# Patient Record
Sex: FEMALE | Race: WHITE | Employment: FULL TIME | URBAN - METROPOLITAN AREA
[De-identification: names, ages, dates, MRNs, and addresses within clinical notes are randomized per-mention and may not be internally consistent; named-entity substitution may affect disease eponyms.]

---

## 2018-04-30 ENCOUNTER — OFFICE VISIT (OUTPATIENT)
Dept: PULMONOLOGY | Age: 53
End: 2018-04-30
Payer: COMMERCIAL

## 2018-04-30 ENCOUNTER — HOSPITAL ENCOUNTER (OUTPATIENT)
Age: 53
Discharge: HOME | End: 2018-04-30
Payer: COMMERCIAL

## 2018-04-30 VITALS
SYSTOLIC BLOOD PRESSURE: 122 MMHG | BODY MASS INDEX: 34.23 KG/M2 | DIASTOLIC BLOOD PRESSURE: 86 MMHG | TEMPERATURE: 97.1 F | HEART RATE: 78 BPM | OXYGEN SATURATION: 98 % | RESPIRATION RATE: 16 BRPM | WEIGHT: 186 LBS | HEIGHT: 62 IN

## 2018-04-30 DIAGNOSIS — J45.50 POORLY CONTROLLED SEVERE PERSISTENT ASTHMA WITHOUT COMPLICATION: Primary | ICD-10-CM

## 2018-04-30 DIAGNOSIS — Z91.09 MULTIPLE ENVIRONMENTAL ALLERGIES: ICD-10-CM

## 2018-04-30 DIAGNOSIS — J82.83 EOSINOPHILIC ASTHMA: ICD-10-CM

## 2018-04-30 DIAGNOSIS — Z87.891 HISTORY OF SMOKING LESS THAN 10 PACK YEARS: ICD-10-CM

## 2018-04-30 DIAGNOSIS — J45.50 POORLY CONTROLLED SEVERE PERSISTENT ASTHMA WITHOUT COMPLICATION: ICD-10-CM

## 2018-04-30 LAB
EOSINOPHILS ABSOLUTE: 78 /UL (ref 200–400)
EOSINOPHILS RELATIVE PERCENT: ABNORMAL %
IGE: 67 IU/ML
WBC # BLD: ABNORMAL K/UL

## 2018-04-30 PROCEDURE — G8417 CALC BMI ABV UP PARAM F/U: HCPCS | Performed by: INTERNAL MEDICINE

## 2018-04-30 PROCEDURE — 82785 ASSAY OF IGE: CPT

## 2018-04-30 PROCEDURE — 3017F COLORECTAL CA SCREEN DOC REV: CPT | Performed by: INTERNAL MEDICINE

## 2018-04-30 PROCEDURE — 1036F TOBACCO NON-USER: CPT | Performed by: INTERNAL MEDICINE

## 2018-04-30 PROCEDURE — 99205 OFFICE O/P NEW HI 60 MIN: CPT | Performed by: INTERNAL MEDICINE

## 2018-04-30 PROCEDURE — 85048 AUTOMATED LEUKOCYTE COUNT: CPT

## 2018-04-30 PROCEDURE — 36415 COLL VENOUS BLD VENIPUNCTURE: CPT

## 2018-04-30 PROCEDURE — G8427 DOCREV CUR MEDS BY ELIG CLIN: HCPCS | Performed by: INTERNAL MEDICINE

## 2018-04-30 RX ORDER — ALBUTEROL SULFATE 90 UG/1
2 AEROSOL, METERED RESPIRATORY (INHALATION) EVERY 6 HOURS PRN
COMMUNITY
End: 2018-04-30 | Stop reason: SDUPTHER

## 2018-04-30 RX ORDER — GABAPENTIN 100 MG/1
100 CAPSULE ORAL DAILY PRN
COMMUNITY

## 2018-04-30 RX ORDER — SERTRALINE HYDROCHLORIDE 100 MG/1
100 TABLET, FILM COATED ORAL DAILY
COMMUNITY

## 2018-04-30 RX ORDER — MONTELUKAST SODIUM 10 MG/1
10 TABLET ORAL NIGHTLY
Qty: 30 TABLET | Refills: 11 | Status: SHIPPED | OUTPATIENT
Start: 2018-04-30 | End: 2019-05-22 | Stop reason: SDUPTHER

## 2018-04-30 RX ORDER — FLUTICASONE FUROATE AND VILANTEROL 200; 25 UG/1; UG/1
1 POWDER RESPIRATORY (INHALATION) DAILY
Qty: 2 EACH | Refills: 0 | COMMUNITY
Start: 2018-04-30 | End: 2019-07-29

## 2018-04-30 RX ORDER — FLUTICASONE FUROATE AND VILANTEROL 200; 25 UG/1; UG/1
1 POWDER RESPIRATORY (INHALATION) DAILY
Qty: 1 EACH | Refills: 11 | Status: SHIPPED | OUTPATIENT
Start: 2018-04-30 | End: 2019-05-04 | Stop reason: SDUPTHER

## 2018-04-30 RX ORDER — ALBUTEROL SULFATE 90 UG/1
2 AEROSOL, METERED RESPIRATORY (INHALATION) EVERY 6 HOURS PRN
Qty: 1 INHALER | Refills: 11 | Status: SHIPPED | OUTPATIENT
Start: 2018-04-30 | End: 2019-07-29 | Stop reason: SDUPTHER

## 2018-04-30 ASSESSMENT — ENCOUNTER SYMPTOMS
GASTROINTESTINAL NEGATIVE: 1
EYES NEGATIVE: 1
SHORTNESS OF BREATH: 1
COUGH: 1
WHEEZING: 1
ALLERGIC/IMMUNOLOGIC NEGATIVE: 1

## 2018-07-30 ENCOUNTER — OFFICE VISIT (OUTPATIENT)
Dept: PULMONOLOGY | Age: 53
End: 2018-07-30
Payer: COMMERCIAL

## 2018-07-30 VITALS
SYSTOLIC BLOOD PRESSURE: 117 MMHG | BODY MASS INDEX: 34.23 KG/M2 | HEIGHT: 62 IN | TEMPERATURE: 98.9 F | RESPIRATION RATE: 16 BRPM | OXYGEN SATURATION: 97 % | DIASTOLIC BLOOD PRESSURE: 87 MMHG | WEIGHT: 186 LBS | HEART RATE: 75 BPM

## 2018-07-30 DIAGNOSIS — J45.20 INTERMITTENT ASTHMA WITHOUT COMPLICATION, UNSPECIFIED ASTHMA SEVERITY: ICD-10-CM

## 2018-07-30 DIAGNOSIS — J82.83 EOSINOPHILIC ASTHMA: ICD-10-CM

## 2018-07-30 DIAGNOSIS — Z91.09 MULTIPLE ENVIRONMENTAL ALLERGIES: Primary | ICD-10-CM

## 2018-07-30 DIAGNOSIS — Z87.891 HISTORY OF SMOKING LESS THAN 10 PACK YEARS: ICD-10-CM

## 2018-07-30 PROCEDURE — 3017F COLORECTAL CA SCREEN DOC REV: CPT | Performed by: NURSE PRACTITIONER

## 2018-07-30 PROCEDURE — G8417 CALC BMI ABV UP PARAM F/U: HCPCS | Performed by: NURSE PRACTITIONER

## 2018-07-30 PROCEDURE — 99214 OFFICE O/P EST MOD 30 MIN: CPT | Performed by: NURSE PRACTITIONER

## 2018-07-30 PROCEDURE — G8427 DOCREV CUR MEDS BY ELIG CLIN: HCPCS | Performed by: NURSE PRACTITIONER

## 2018-07-30 PROCEDURE — 1036F TOBACCO NON-USER: CPT | Performed by: NURSE PRACTITIONER

## 2018-07-30 ASSESSMENT — ENCOUNTER SYMPTOMS
ALLERGIC/IMMUNOLOGIC NEGATIVE: 1
EYES NEGATIVE: 1
GASTROINTESTINAL NEGATIVE: 1

## 2018-07-30 NOTE — PROGRESS NOTES
performed during the hospital encounter of 04/30/18   Eosinophil Count   Result Value Ref Range    WBC NOT REPORTED k/uL    Eosinophils % NOT REPORTED %    Eosinophils # 78 (L) 200 - 400 /uL   IgE   Result Value Ref Range    IgE 67 <101 IU/mL       No results found. Assessment:      1. Multiple environmental allergies    2. History of smoking less than 10 pack years    3. Eosinophilic asthma ([address]    4. Intermittent asthma without complication, unspecified asthma severity          Plan:      1. Medications reviewed, continue as ordered. 2. Refills were provided - no  3. Educated and clarified the medication use. 4. Recommend flu vaccination in the fall annually. Due in fall of 2018, patient reluctant. Education provided on benefits of flu vaccination with underlying asthma  5. Maintain an active lifestyle. 6. Patient's questions were answered to her satisfaction. 7. Former smoker, ongoing cessation advised. 8. We'll see the patient back in 12 months, sooner if experiencing increased shortness of breath cough/wheeze.         Electronically signed by CHAYO Gaytan CNP on 7/30/2018 at 1:36 PM

## 2019-03-06 ENCOUNTER — INITIAL CONSULT (OUTPATIENT)
Dept: ONCOLOGY | Age: 54
End: 2019-03-06
Payer: COMMERCIAL

## 2019-03-06 ENCOUNTER — HOSPITAL ENCOUNTER (OUTPATIENT)
Facility: MEDICAL CENTER | Age: 54
End: 2019-03-06
Payer: COMMERCIAL

## 2019-03-06 DIAGNOSIS — Z17.0 MALIGNANT NEOPLASM OF LEFT BREAST IN FEMALE, ESTROGEN RECEPTOR POSITIVE, UNSPECIFIED SITE OF BREAST (HCC): Primary | ICD-10-CM

## 2019-03-06 DIAGNOSIS — C50.912 MALIGNANT NEOPLASM OF LEFT BREAST IN FEMALE, ESTROGEN RECEPTOR POSITIVE, UNSPECIFIED SITE OF BREAST (HCC): Primary | ICD-10-CM

## 2019-03-06 PROCEDURE — 96040 PR GENETIC COUNSELING, EACH 30 MIN: CPT | Performed by: GENETIC COUNSELOR, MS

## 2019-03-20 ENCOUNTER — TELEPHONE (OUTPATIENT)
Dept: ONCOLOGY | Age: 54
End: 2019-03-20

## 2019-03-20 NOTE — TELEPHONE ENCOUNTER
do not affect her current cancer treatment. Ms. Antoinette Liriano should continue cancer treatment and surveillance as directed by her physicians. OVARIAN CANCER  Without a known hereditary mutation and no known family history of ovarian cancer, Ms. De Los Santoss risk for ovarian cancer is expected to be equal to the general population risk of 1-2%. Therefore, ovarian cancer screening or risk reducing surgery is not recommended. She should continue gynecologic cancer screening as directed by her physicians. GENERAL CANCER   Ms. Antoinette Liriano should continue general population cancer screening guidelines as directed by her physicians. RECOMMENDATIONS FOR FAMILY MEMBERS   1) Genetic testing is not recommended for Ms. Snyder's children based on her negative test results. However, this recommendation does not take into consideration any family history of cancer in their paternal family. Additionally, there is a low likelihood that the other cancers in Ms. De Los Santoss family are caused by a hereditary gene mutation. Therefore, based on the family history information provided by Ms. Snyder, genetic testing is not recommended for other family members at this time. 2) We encourage Ms. De Los Santoss relatives to discuss their family history of cancer with their physicians to determine the most appropriate cancer screening recommendations. Ms. Snyder's female relatives may benefit from a formal breast cancer risk assessment by the [facility] or [facility] risk models to determine if additional breast cancer screening is warranted. SUMMARY & PLAN   1) There are no changes in medical management for Ms. Snyder based on her negative genetic test results. She should continue cancer surveillance as directed by her physicians. 2) We will contact Ms. 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 3) We encourage Ms. Snyder to contact us every 1-2 years to determine if there are any new genetic testing or research options available. 4) We encourage Ms. Snyder to contact us with updates to her personal and/or familys cancer history as this information may alter our assessment and/or recommendations. The [address] [place] [address] would be glad to offer our assistance should you have any questions or concerns about this information. Please feel free to contact us at [phone]. Fay Spears.  Jessica Post MS, [facility]   Licensed Genetic Counselor         CC:  MD Frederick Howe MD

## 2019-04-26 ENCOUNTER — TELEPHONE (OUTPATIENT)
Dept: RADIATION ONCOLOGY | Age: 54
End: 2019-04-26

## 2019-05-04 DIAGNOSIS — J45.50 POORLY CONTROLLED SEVERE PERSISTENT ASTHMA WITHOUT COMPLICATION: ICD-10-CM

## 2019-05-06 NOTE — TELEPHONE ENCOUNTER
Dr Robynn Severin, patient is current and due in for an appointment on 7/29/19. Per Yolie Covington last dictation patient is on Breo. Please sign for refill if ok. Thank you.

## 2019-05-14 ENCOUNTER — TELEPHONE (OUTPATIENT)
Dept: RADIATION ONCOLOGY | Age: 54
End: 2019-05-14

## 2019-05-14 NOTE — TELEPHONE ENCOUNTER
19 I called pt to remind of consult appt tomorrow. I called Dr. Samayoa House [facility] to check if oncotype is back, not back yet. Called [facility] to check if oncotype back, not back yet. I spoke to Keisha Henson, she said to cancel pts consult tomorrow, wait on oncotype. REsched consult 19, pt advised.

## 2019-05-22 ENCOUNTER — TELEPHONE (OUTPATIENT)
Dept: RADIATION ONCOLOGY | Age: 54
End: 2019-05-22

## 2019-05-22 DIAGNOSIS — J45.50 POORLY CONTROLLED SEVERE PERSISTENT ASTHMA WITHOUT COMPLICATION: ICD-10-CM

## 2019-05-22 RX ORDER — MONTELUKAST SODIUM 10 MG/1
TABLET ORAL
Qty: 30 TABLET | Refills: 2 | Status: SHIPPED | OUTPATIENT
Start: 2019-05-22 | End: 2019-07-29 | Stop reason: SDUPTHER

## 2019-05-22 NOTE — TELEPHONE ENCOUNTER
Pt called to cancel her RO consult 5/24/19, she states she had appt w/Dr. Chacha Richard yesterday and she has to do chemo first, so wants to delay RO consult until towards end of chemo.   RN notified, pt placed on pending list.

## 2019-07-12 ENCOUNTER — TELEPHONE (OUTPATIENT)
Dept: RADIATION ONCOLOGY | Age: 54
End: 2019-07-12

## 2019-07-29 ENCOUNTER — OFFICE VISIT (OUTPATIENT)
Dept: PULMONOLOGY | Age: 54
End: 2019-07-29
Payer: COMMERCIAL

## 2019-07-29 VITALS
HEART RATE: 103 BPM | HEIGHT: 61 IN | SYSTOLIC BLOOD PRESSURE: 128 MMHG | OXYGEN SATURATION: 97 % | BODY MASS INDEX: 35.95 KG/M2 | RESPIRATION RATE: 14 BRPM | DIASTOLIC BLOOD PRESSURE: 86 MMHG | WEIGHT: 190.4 LBS

## 2019-07-29 DIAGNOSIS — J45.50 POORLY CONTROLLED SEVERE PERSISTENT ASTHMA WITHOUT COMPLICATION: ICD-10-CM

## 2019-07-29 DIAGNOSIS — Z91.09 MULTIPLE ENVIRONMENTAL ALLERGIES: Primary | ICD-10-CM

## 2019-07-29 DIAGNOSIS — J82.83 EOSINOPHILIC ASTHMA: ICD-10-CM

## 2019-07-29 PROCEDURE — 99213 OFFICE O/P EST LOW 20 MIN: CPT | Performed by: NURSE PRACTITIONER

## 2019-07-29 RX ORDER — MONTELUKAST SODIUM 10 MG/1
TABLET ORAL
Qty: 30 TABLET | Refills: 11 | Status: SHIPPED | OUTPATIENT
Start: 2019-07-29 | End: 2020-08-04

## 2019-07-29 RX ORDER — ALBUTEROL SULFATE 90 UG/1
2 AEROSOL, METERED RESPIRATORY (INHALATION) EVERY 6 HOURS PRN
Qty: 1 INHALER | Refills: 11 | Status: SHIPPED | OUTPATIENT
Start: 2019-07-29 | End: 2021-08-31 | Stop reason: SDUPTHER

## 2019-07-29 RX ORDER — FLUTICASONE FUROATE AND VILANTEROL 200; 25 UG/1; UG/1
POWDER RESPIRATORY (INHALATION)
Qty: 1 EACH | Refills: 11 | Status: SHIPPED | OUTPATIENT
Start: 2019-07-29 | End: 2020-08-03

## 2019-07-29 RX ORDER — LETROZOLE 2.5 MG/1
2.5 TABLET, FILM COATED ORAL DAILY
COMMUNITY

## 2019-07-29 ASSESSMENT — ENCOUNTER SYMPTOMS
EYES NEGATIVE: 1
GASTROINTESTINAL NEGATIVE: 1
ALLERGIC/IMMUNOLOGIC NEGATIVE: 1

## 2019-07-29 NOTE — PROGRESS NOTES
Alcohol use: Yes     Alcohol/week: 2.0 standard drinks     Types: 2 Glasses of wine per week     Comment: Per day    Drug use: No    Sexual activity: Not on file   Lifestyle    Physical activity:     Days per week: Not on file     Minutes per session: Not on file    Stress: Not on file   Relationships    Social connections:     Talks on phone: Not on file     Gets together: Not on file     Attends Synagogue service: Not on file     Active member of club or organization: Not on file     Attends meetings of clubs or organizations: Not on file     Relationship status: Not on file    Intimate partner violence:     Fear of current or ex partner: Not on file     Emotionally abused: Not on file     Physically abused: Not on file     Forced sexual activity: Not on file   Other Topics Concern    Not on file   Social History Narrative    Not on file       Review of Systems   Constitutional: Negative. HENT: Negative. Eyes: Negative. Respiratory:        Exertional shortness of breath, denies cough, mucus production, hemoptysis. Cardiovascular: Negative. Gastrointestinal: Negative. Endocrine: Negative. Genitourinary: Negative. Musculoskeletal: Negative. Skin: Negative. Allergic/Immunologic: Negative. Neurological: Negative. Hematological: Negative. Psychiatric/Behavioral: Negative.         Objective:      Physical Exam   General appearance - alert, well appearing, and in no distress, oriented to person, place, and time and normal appearing weight  Mental status - alert, oriented to person, place, and time, normal mood, behavior, speech, dress, motor activity, and thought processes  Eyes - pupils equal and reactive, extraocular eye movements intact  Ears - not examined  Nose - normal and patent, no erythema, discharge or polyps  Mouth - mucous membranes moist, pharynx normal without lesions  Neck - supple, no significant adenopathy  Chest - clear to auscultation, no wheezes, rales or rhonchi, symmetric air entry  Heart -normal rate, regular rhythm, normal S1, S2, no murmurs, rubs, clicks or gallops  Abdomen - soft, nontender, nondistended, no masses or organomegaly  Neurological - alert, oriented, normal speech, no focal findings or movement disorder noted}  Extremities - peripheral pulses normal, no pedal edema, no clubbing or cyanosis  Skin - normal coloration and turgor, no rashes, no suspicious skin lesions noted     Wt Readings from Last 3 Encounters:   07/29/19 190 lb 6.4 oz (86.4 kg)   07/30/18 186 lb (84.4 kg)   04/30/18 186 lb (84.4 kg)       Results for orders placed or performed during the hospital encounter of 04/30/18   Eosinophil Count   Result Value Ref Range    WBC NOT REPORTED k/uL    Eosinophils % NOT REPORTED %    Eosinophils # 78 (L) 200 - 400 /uL   IgE   Result Value Ref Range    IgE 67 <101 IU/mL       No results found. Assessment:      1. Multiple environmental allergies    2. Poorly controlled severe persistent asthma without complication    3. Eosinophilic asthma ([facility] [address]          Plan:      1. Medications reviewed, continue as ordered. 2. Refills were provided - yes  3. Educated and clarified the medication use. 4. Recommend flu vaccination in the fall annually. Refuses  5. Recommendations given regarding pneumococcal vaccinations. Refuses  6. Maintain an active lifestyle. 7. Patient's questions were answered to her satisfaction. 6. Former smoker. Ongoing smoking cessation advised.   9. We'll see the patient back in 12 months        Electronically signed by CHAYO Aguilar CNP on 7/29/2019 at 2:19 PM

## 2019-08-13 ENCOUNTER — HOSPITAL ENCOUNTER (OUTPATIENT)
Dept: RADIATION ONCOLOGY | Age: 54
Discharge: HOME | End: 2019-08-13
Payer: COMMERCIAL

## 2019-08-13 VITALS
TEMPERATURE: 99.3 F | SYSTOLIC BLOOD PRESSURE: 116 MMHG | HEART RATE: 90 BPM | OXYGEN SATURATION: 98 % | WEIGHT: 193 LBS | RESPIRATION RATE: 18 BRPM | BODY MASS INDEX: 35.51 KG/M2 | DIASTOLIC BLOOD PRESSURE: 81 MMHG | HEIGHT: 62 IN

## 2019-08-13 DIAGNOSIS — Z17.0 MALIGNANT NEOPLASM OF LOWER-INNER QUADRANT OF LEFT BREAST IN FEMALE, ESTROGEN RECEPTOR POSITIVE (HCC): ICD-10-CM

## 2019-08-13 DIAGNOSIS — C50.312 MALIGNANT NEOPLASM OF LOWER-INNER QUADRANT OF LEFT BREAST IN FEMALE, ESTROGEN RECEPTOR POSITIVE (HCC): ICD-10-CM

## 2019-08-13 PROCEDURE — 99213 OFFICE O/P EST LOW 20 MIN: CPT | Performed by: RADIOLOGY

## 2019-08-13 NOTE — PROGRESS NOTES
Referring Physician: Dr Lelia Flores:    19 1045   BP: 116/81   Pulse: 90   Resp: 18   Temp: 99.3 °F (37.4 °C)   SpO2: 98%    :  Patient Currently in Pain: Denies             Wt Readings from Last 1 Encounters:   19 193 lb (87.5 kg)        Body mass index is 35.88 kg/m². Height: 5' 1.5\" (156.2 cm)         Immunizations:    Influenza status:    []   Current   [x]   Patient declined    Pneumococcal status:  []   Current  [x]   Patient declined    Smoking Status:    [] Smoker - PPD:   [x] Nonsmoker - Quit Date:  0             [] Never a smoker             LMP:     Age at first Menses: 16    Para: 2    : 2    Cup size: 45 D        No chief complaint on file. Cancer Staging  No matching staging information was found for the patient. Prior Radiation Therapy? No   If yes, site treated:   Facility:                             Date:    Concurrent Chemo/radiation? No   If yes, start date:    Prior Chemotherapy? Yes   If yes    Facility: [facility]                              Date:     Prior Hormonal Therapy? No   If yes   Facility:                             Date:    Head and Neck Cancer? No   If yes, please remind physician to place swallow study order and speech therapy order. Pacemaker/Defibulator/ICD:  No              Current Outpatient Medications   Medication Sig Dispense Refill    letrozole (FEMARA) 2.5 MG tablet Take 2.5 mg by mouth daily      Fluticasone Furoate-Vilanterol (BREO ELLIPTA) 200-25 MCG/INH AEPB INHALE ONE DOSE BY MOUTH INTO THE LUNGS DAILY 1 each 11    montelukast (SINGULAIR) 10 MG tablet TAKE ONE TABLET BY MOUTH ONCE NIGHTLY 30 tablet 11    albuterol sulfate HFA (VENTOLIN HFA) 108 (90 Base) MCG/ACT inhaler Inhale 2 puffs into the lungs every 6 hours as needed for Wheezing 1 Inhaler 11    sertraline (ZOLOFT) 100 MG tablet Take 100 mg by mouth daily      gabapentin (NEURONTIN) 100 MG capsule Take 100 mg by mouth daily as needed.        Cetirizine HCl (ZYRTEC ALLERGY PO) Take by mouth       No current facility-administered medications for this encounter. Past Medical History:   Diagnosis Date    Anxiety     Asthma     Eosinophilic asthma ([address]     History of smoking less than 10 pack years     Multiple environmental allergies        Past Surgical History:   Procedure Laterality Date    MOUTH SURGERY      Lambert Teeth       Family History   Problem Relation Age of Onset    Colon Cancer Paternal Grandmother         dx older   [facility] Prostate Cancer Father         64-70    Cancer Maternal Grandfather         bladder        Social History     Socioeconomic History    Marital status:      Spouse name: Not on file    Number of children: Not on file    Years of education: Not on file    Highest education level: Not on file   Occupational History    Not on file   Social Needs    Financial resource strain: Not on file    Food insecurity:     Worry: Not on file     Inability: Not on file    Transportation needs:     Medical: Not on file     Non-medical: Not on file   Tobacco Use    Smoking status: Former Smoker     Types: Cigarettes     Last attempt to quit: 1997     Years since quittin.0    Smokeless tobacco: Never Used   Substance and Sexual Activity    Alcohol use:  Yes     Alcohol/week: 2.0 standard drinks     Types: 2 Glasses of wine per week     Comment: Per day    Drug use: No    Sexual activity: Not on file   Lifestyle    Physical activity:     Days per week: Not on file     Minutes per session: Not on file    Stress: Not on file   Relationships    Social connections:     Talks on phone: Not on file     Gets together: Not on file     Attends Worship service: Not on file     Active member of club or organization: Not on file     Attends meetings of clubs or organizations: Not on file     Relationship status: Not on file    Intimate partner violence:     Fear of current or ex partner: Not on file Emotionally abused: Not on file     Physically abused: Not on file     Forced sexual activity: Not on file   Other Topics Concern    Not on file   Social History Narrative    Not on file             FALLS RISK SCREEN  Instructions:  Assess the patient and enter the appropriate indicators that are present for fall risk identification. Total the numbers entered and assign a fall risk score from Table 2.  Reassess patient at a minimum every 12 weeks or with status change. Assessment   Date  8/13/2019     1. Mental Ability: confusion/cognitively impaired 0     2. Elimination Issues: incontinence, frequency 0       3. Ambulatory: use of assistive devices (walker, cane, off-loading devices),        attached to equipment (IV pole, oxygen) 0     4. Sensory Limitations: dizziness, vertigo, impaired vision 0     5. Age less than 65        0     6. Age 72 or greater 0     7. Medication: diuretics, strong analgesics, hypnotics, sedatives,        antihypertensive agents 0   8. Falls:  recent history of falls within the last 3 months (not to include slipping or        tripping) 0   TOTAL 0    If score of 4 or greater was education given? No       TABLE 2   Risk Score Risk Level Plan of Care   0-3 Little or  No Risk 1. Provide assistance as indicated for ambulation activities  2. Reorient confused/cognitively impaired patient  3. Call-light/bell within patient's reach  4. Chair/bed in low position, stretcher/bed with siderails up except when performing patient care activities  5. Educate patient/family/caregiver on falls prevention  6.  Reassess in 12 weeks or with any noted change in patient condition which places them at a risk for a fall   4-6 Moderate Risk 1. Provide assistance as indicated for ambulation activities  2. Reorient confused/cognitively impaired patient  3. Call-light/bell within patient's reach  4.   Chair/bed in low position, stretcher/bed with siderails up except when performing patient

## 2019-08-14 NOTE — CONSULTS
either breast.  The axillae both right and left are without adenopathy. ASSESSMENT AND PLAN:   Ernesto Resendiz is a 49 y.o. female with a Cancer Staging  Malignant neoplasm of lower-inner quadrant of left breast in female, estrogen receptor positive ([facility] [address]  Staging form: Breast, AJCC 8th Edition  - Clinical: No stage assigned - Unsigned  - Pathologic stage from 8/13/2019: Stage IIA (pT2, pN0(sn), cM0, G2, ER+, WI-, HER2-, Oncotype DX score: 30) - Signed by Mayra Hernandez MD on 8/13/2019    The patient is a 43-year-old female with a diagnosis of an invasive lumbar carcinoma offered the left breast status post lumpectomy on 4/5/2019 with a pathological stage as above. See details above regarding the lumpectomy and reexcision. The patient did have an Oncotype DX tests which revealed a high risk score and therefore proceeded with adjuvant chemotherapy which was completed on 8/9/2019. The patient received TC. The patient is now a candidate for adjuvant radiation therapy to the left the breasts. I did review with the patient multiple clinical trials that documented the clinical benefit for adjuvant radiation therapy to the breast in this scenario. I reviewed the logistics, expected outcome possible side effects of radiation therapy. An informed consent was obtained. The patient will therefore proceeded to a CT simulation. She will then be treated to the left breast utilizing the hypo-fractionated regimen and the deep inspiratory breath hold technique. This will allow us to decrease the dose to the underlying heart as much as possible. Prior to commencing treatment the patient will have repeat CBC.          Electronically signed by Mayra Hernandez MD on 8/13/2019 at [address] [place] [address]:  Patient Care Team:  Rose Simpson DO as PCP - General (Family Medicine)  Faiza Morris DO as Consulting Physician (Pulmonology)     Drugs Prescribed:  New 08:22

## 2019-08-22 ENCOUNTER — HOSPITAL ENCOUNTER (OUTPATIENT)
Dept: RADIATION ONCOLOGY | Age: 54
Discharge: HOME | End: 2019-08-22
Attending: RADIOLOGY
Payer: COMMERCIAL

## 2019-08-22 ENCOUNTER — TELEPHONE (OUTPATIENT)
Dept: ONCOLOGY | Age: 54
End: 2019-08-22

## 2019-08-22 DIAGNOSIS — Z17.0 MALIGNANT NEOPLASM OF LOWER-INNER QUADRANT OF LEFT BREAST IN FEMALE, ESTROGEN RECEPTOR POSITIVE (HCC): Primary | ICD-10-CM

## 2019-08-22 DIAGNOSIS — C50.312 MALIGNANT NEOPLASM OF LOWER-INNER QUADRANT OF LEFT BREAST IN FEMALE, ESTROGEN RECEPTOR POSITIVE (HCC): Primary | ICD-10-CM

## 2019-08-22 PROCEDURE — 77334 RADIATION TREATMENT AID(S): CPT | Performed by: RADIOLOGY

## 2019-08-22 PROCEDURE — 77290 THER RAD SIMULAJ FIELD CPLX: CPT | Performed by: RADIOLOGY

## 2019-08-22 NOTE — PROGRESS NOTES
Pt here today for teach and simulation scan. Radiation and You information provided along with additional education regarding radiation therapy and what to expect. Pt verbalizes understanding and all questions answered to the best of my knowledge. Samples of aquaphor and community resource information provided. Pt escorted to CT room without any difficulty.

## 2019-08-26 ENCOUNTER — TELEPHONE (OUTPATIENT)
Dept: ONCOLOGY | Age: 54
End: 2019-08-26

## 2019-08-26 NOTE — TELEPHONE ENCOUNTER
called patient to introduce self and services.  left message with contact information and encouraged patient to call if needed.

## 2019-08-28 ENCOUNTER — HOSPITAL ENCOUNTER (OUTPATIENT)
Dept: RADIATION ONCOLOGY | Age: 54
Discharge: HOME | End: 2019-08-28
Attending: RADIOLOGY
Payer: COMMERCIAL

## 2019-08-28 PROCEDURE — 77300 RADIATION THERAPY DOSE PLAN: CPT | Performed by: RADIOLOGY

## 2019-08-28 PROCEDURE — 77295 3-D RADIOTHERAPY PLAN: CPT | Performed by: RADIOLOGY

## 2019-08-28 PROCEDURE — 77334 RADIATION TREATMENT AID(S): CPT | Performed by: RADIOLOGY

## 2019-09-03 ENCOUNTER — HOSPITAL ENCOUNTER (OUTPATIENT)
Dept: RADIATION ONCOLOGY | Age: 54
Discharge: HOME | End: 2019-09-03
Attending: RADIOLOGY
Payer: COMMERCIAL

## 2019-09-03 ENCOUNTER — APPOINTMENT (OUTPATIENT)
Dept: RADIATION ONCOLOGY | Age: 54
End: 2019-09-03
Attending: RADIOLOGY
Payer: COMMERCIAL

## 2019-09-03 VITALS
WEIGHT: 195.4 LBS | HEART RATE: 78 BPM | DIASTOLIC BLOOD PRESSURE: 80 MMHG | SYSTOLIC BLOOD PRESSURE: 103 MMHG | OXYGEN SATURATION: 99 % | BODY MASS INDEX: 36.32 KG/M2 | RESPIRATION RATE: 16 BRPM | TEMPERATURE: 98.1 F

## 2019-09-03 DIAGNOSIS — Z17.0 MALIGNANT NEOPLASM OF LOWER-INNER QUADRANT OF LEFT BREAST IN FEMALE, ESTROGEN RECEPTOR POSITIVE (HCC): Primary | ICD-10-CM

## 2019-09-03 DIAGNOSIS — C50.312 MALIGNANT NEOPLASM OF LOWER-INNER QUADRANT OF LEFT BREAST IN FEMALE, ESTROGEN RECEPTOR POSITIVE (HCC): Primary | ICD-10-CM

## 2019-09-03 PROCEDURE — 77412 RADIATION TX DELIVERY LVL 3: CPT | Performed by: RADIOLOGY

## 2019-09-03 PROCEDURE — 77280 THER RAD SIMULAJ FIELD SMPL: CPT | Performed by: RADIOLOGY

## 2019-09-03 PROCEDURE — 77387 GUIDANCE FOR RADJ TX DLVR: CPT | Performed by: RADIOLOGY

## 2019-09-03 NOTE — PROGRESS NOTES
Nannette Snyder  9/3/2019  Wt Readings from Last 3 Encounters:   09/03/19 195 lb 6.4 oz (88.6 kg)   08/13/19 193 lb (87.5 kg)   07/29/19 190 lb 6.4 oz (86.4 kg)     Body mass index is 36.32 kg/m². Treatment Area:left breast     Patient was seen today for weekly visit. Comfort Alteration    Fatigue: Mild    Nutritional Alteration  Anorexia: No   Nausea: No   Vomiting: No     Mucous Membrane Alteration  Drainage: No  Lymphedema: No    Skin Alteration   Sensation:intact     Radiation Dermatitis:  Intact [x]     Erythema  []     Discoloration  []     Rash []     Dry desquamation  []     Moist desquamation []       Emotional  Coping: effective      Injury, potential bleeding or infection: n/a     Lab Results   Component Value Date    WBC NOT REPORTED 04/30/2018         /80   Pulse 78   Temp 98.1 °F (36.7 °C)   Resp 16   Wt 195 lb 6.4 oz (88.6 kg)   SpO2 99%   BMI 36.32 kg/m²   Patient Currently in Pain: Denies                 Assessment/Plan: Patient was seen today for weekly visit. Dr Yossi Barton notified and examined pt in treatment room.      David Minaya

## 2019-09-04 ENCOUNTER — HOSPITAL ENCOUNTER (OUTPATIENT)
Dept: RADIATION ONCOLOGY | Age: 54
Discharge: HOME | End: 2019-09-04
Attending: RADIOLOGY
Payer: COMMERCIAL

## 2019-09-04 PROCEDURE — 77387 GUIDANCE FOR RADJ TX DLVR: CPT | Performed by: RADIOLOGY

## 2019-09-04 PROCEDURE — 77412 RADIATION TX DELIVERY LVL 3: CPT | Performed by: RADIOLOGY

## 2019-09-05 ENCOUNTER — HOSPITAL ENCOUNTER (OUTPATIENT)
Dept: RADIATION ONCOLOGY | Age: 54
Discharge: HOME | End: 2019-09-05
Attending: RADIOLOGY
Payer: COMMERCIAL

## 2019-09-05 PROCEDURE — 77387 GUIDANCE FOR RADJ TX DLVR: CPT | Performed by: RADIOLOGY

## 2019-09-05 PROCEDURE — 77412 RADIATION TX DELIVERY LVL 3: CPT | Performed by: RADIOLOGY

## 2019-09-06 ENCOUNTER — HOSPITAL ENCOUNTER (OUTPATIENT)
Dept: RADIATION ONCOLOGY | Age: 54
Discharge: HOME | End: 2019-09-06
Attending: RADIOLOGY
Payer: COMMERCIAL

## 2019-09-06 PROCEDURE — 77387 GUIDANCE FOR RADJ TX DLVR: CPT | Performed by: RADIOLOGY

## 2019-09-06 PROCEDURE — 77412 RADIATION TX DELIVERY LVL 3: CPT | Performed by: RADIOLOGY

## 2019-09-09 ENCOUNTER — HOSPITAL ENCOUNTER (OUTPATIENT)
Dept: RADIATION ONCOLOGY | Age: 54
Discharge: HOME | End: 2019-09-09
Attending: RADIOLOGY
Payer: COMMERCIAL

## 2019-09-09 VITALS
DIASTOLIC BLOOD PRESSURE: 78 MMHG | BODY MASS INDEX: 36.47 KG/M2 | TEMPERATURE: 98.6 F | SYSTOLIC BLOOD PRESSURE: 118 MMHG | OXYGEN SATURATION: 98 % | RESPIRATION RATE: 16 BRPM | WEIGHT: 196.2 LBS | HEART RATE: 78 BPM

## 2019-09-09 DIAGNOSIS — C50.312 MALIGNANT NEOPLASM OF LOWER-INNER QUADRANT OF LEFT BREAST IN FEMALE, ESTROGEN RECEPTOR POSITIVE (HCC): Primary | ICD-10-CM

## 2019-09-09 DIAGNOSIS — Z17.0 MALIGNANT NEOPLASM OF LOWER-INNER QUADRANT OF LEFT BREAST IN FEMALE, ESTROGEN RECEPTOR POSITIVE (HCC): Primary | ICD-10-CM

## 2019-09-09 PROCEDURE — 77412 RADIATION TX DELIVERY LVL 3: CPT | Performed by: RADIOLOGY

## 2019-09-09 PROCEDURE — 77387 GUIDANCE FOR RADJ TX DLVR: CPT | Performed by: RADIOLOGY

## 2019-09-09 PROCEDURE — 77336 RADIATION PHYSICS CONSULT: CPT | Performed by: RADIOLOGY

## 2019-09-09 NOTE — PROGRESS NOTES
tablet, Take 100 mg by mouth daily, Disp: , Rfl:     gabapentin (NEURONTIN) 100 MG capsule, Take 100 mg by mouth daily as needed. , Disp: , Rfl:     Cetirizine HCl (ZYRTEC ALLERGY PO), Take by mouth, Disp: , Rfl:     Pain Plan:  None needed                 Immunizations/Smoking Status: There is no immunization history on file for this patient. Social History     Tobacco Use   Smoking Status Former Smoker    Types: Cigarettes    Last attempt to quit: 1997    Years since quittin.0   Smokeless Tobacco Never Used     Counseling given: Not Answered      PHYSICAL FINDINGS:    CHAPERONE: Nurse/MA Present    ECO Asymptomatic      Vital Signs: There were no vitals taken for this visit. Wt Readings from Last 5 Encounters:   19 196 lb 3.2 oz (89 kg)   19 195 lb 6.4 oz (88.6 kg)   19 193 lb (87.5 kg)   19 190 lb 6.4 oz (86.4 kg)   18 186 lb (84.4 kg)     The patient is in no acute distress, oriented in time, person and place. Mucous membrane is pink, moist and anicteric. Examination of the left breast reveals a well-healed surgical scar. The skin is intact with mildly erythema and hyperpigmentation. ASSESSMENT PLAN: The patient continues to tolerate radiation therapy well. We'll continue radiation therapy as planned. The patient will continue moisturizers on the breasts is recommended. Electronically signed by Vivien Guthrie MD on 2019 at 9:32 300 [facility]     Drugs Prescribed:  New Prescriptions    No medications on file       Other Orders Placed:  No orders of the defined types were placed in this encounter.

## 2019-09-10 ENCOUNTER — HOSPITAL ENCOUNTER (OUTPATIENT)
Dept: RADIATION ONCOLOGY | Age: 54
Discharge: HOME | End: 2019-09-10
Attending: RADIOLOGY
Payer: COMMERCIAL

## 2019-09-10 PROCEDURE — 77412 RADIATION TX DELIVERY LVL 3: CPT | Performed by: RADIOLOGY

## 2019-09-10 PROCEDURE — 77417 THER RADIOLOGY PORT IMAGE(S): CPT | Performed by: RADIOLOGY

## 2019-09-11 ENCOUNTER — HOSPITAL ENCOUNTER (OUTPATIENT)
Dept: RADIATION ONCOLOGY | Age: 54
Discharge: HOME | End: 2019-09-11
Attending: RADIOLOGY
Payer: COMMERCIAL

## 2019-09-11 PROCEDURE — 77412 RADIATION TX DELIVERY LVL 3: CPT | Performed by: RADIOLOGY

## 2019-09-11 PROCEDURE — 77387 GUIDANCE FOR RADJ TX DLVR: CPT | Performed by: RADIOLOGY

## 2019-09-12 ENCOUNTER — HOSPITAL ENCOUNTER (OUTPATIENT)
Dept: RADIATION ONCOLOGY | Age: 54
Discharge: HOME | End: 2019-09-12
Attending: RADIOLOGY
Payer: COMMERCIAL

## 2019-09-12 PROCEDURE — 77387 GUIDANCE FOR RADJ TX DLVR: CPT | Performed by: RADIOLOGY

## 2019-09-12 PROCEDURE — 77412 RADIATION TX DELIVERY LVL 3: CPT | Performed by: RADIOLOGY

## 2019-09-13 ENCOUNTER — HOSPITAL ENCOUNTER (OUTPATIENT)
Dept: RADIATION ONCOLOGY | Age: 54
Discharge: HOME | End: 2019-09-13
Attending: RADIOLOGY
Payer: COMMERCIAL

## 2019-09-13 ENCOUNTER — TELEPHONE (OUTPATIENT)
Dept: ONCOLOGY | Age: 54
End: 2019-09-13

## 2019-09-13 PROCEDURE — 77412 RADIATION TX DELIVERY LVL 3: CPT | Performed by: RADIOLOGY

## 2019-09-13 PROCEDURE — 77387 GUIDANCE FOR RADJ TX DLVR: CPT | Performed by: RADIOLOGY

## 2019-09-13 NOTE — TELEPHONE ENCOUNTER
Called and spoke with patient. Let her know that I am her nurse navigator from [facility] and I work with her radiation oncology team.  She relates she is doing well. \" so far so good\" she says. I gave her my name and contact number and told her to give me a call if I can help with questions, concerns or road blocks to getting her treatment. Pt wrote down number and expresses understanding.

## 2019-09-16 ENCOUNTER — HOSPITAL ENCOUNTER (OUTPATIENT)
Dept: RADIATION ONCOLOGY | Age: 54
Discharge: HOME | End: 2019-09-16
Attending: RADIOLOGY
Payer: COMMERCIAL

## 2019-09-16 VITALS
WEIGHT: 195 LBS | OXYGEN SATURATION: 98 % | TEMPERATURE: 98.6 F | DIASTOLIC BLOOD PRESSURE: 83 MMHG | RESPIRATION RATE: 18 BRPM | HEART RATE: 90 BPM | SYSTOLIC BLOOD PRESSURE: 117 MMHG | BODY MASS INDEX: 36.25 KG/M2

## 2019-09-16 DIAGNOSIS — C50.312 MALIGNANT NEOPLASM OF LOWER-INNER QUADRANT OF LEFT BREAST IN FEMALE, ESTROGEN RECEPTOR POSITIVE (HCC): Primary | ICD-10-CM

## 2019-09-16 DIAGNOSIS — Z17.0 MALIGNANT NEOPLASM OF LOWER-INNER QUADRANT OF LEFT BREAST IN FEMALE, ESTROGEN RECEPTOR POSITIVE (HCC): Primary | ICD-10-CM

## 2019-09-16 PROCEDURE — 77387 GUIDANCE FOR RADJ TX DLVR: CPT | Performed by: RADIOLOGY

## 2019-09-16 PROCEDURE — 77336 RADIATION PHYSICS CONSULT: CPT | Performed by: RADIOLOGY

## 2019-09-16 PROCEDURE — 77412 RADIATION TX DELIVERY LVL 3: CPT | Performed by: RADIOLOGY

## 2019-09-16 NOTE — PROGRESS NOTES
Ankita Rico M.D. Beverly Henning. Nikki Joy, Ph.D., M.D., Victor Manuel Clark M.D. Carlos Wall, Ph.D., M.D. Winter Morales M.D. Date of Service: 2019    Location:  [address] [place] [address],   [address] [facility], [facility] [place]   [phone]     RADIATION ONCOLOGY WEEKLY PROGRESS NOTE    [facility] 1965   MRN: [id number]    Diagnosis:  Cancer Staging  Malignant neoplasm of lower-inner quadrant of left breast in female, estrogen receptor positive ([facility] [address]  Staging form: Breast, AJCC 8th Edition  - Clinical: No stage assigned - Unsigned  - Pathologic stage from 2019: Stage IIA (pT2, pN0(sn), cM0, G2, ER+, PA-, HER2-, Oncotype DX score: 30) - Signed by Winter Morales MD on 2019      Radiation Treatment Information:   Actual Dose: 2660 cGy  Total Planned Dose: 4256 cGy, 1000 cGy boost Treatment Site: Left breast, left breast boost    IMAGING:   IGRT with DIBH, port films    CHEMOTHERAPY UPDATE:   [No treatment plan]      SUBJECTIVE: Margaret Sarmiento  continues to tolerate radiation therapy well. The patient denies any pains, burning or discharge from the breasts and nipple. She denies any dysphagia or odynophagia. She has no chest pains or palpitation.     OBJECTIVE:     Labs:  WBC   Date Value Ref Range Status   2018 NOT REPORTED k/uL Final     Medications:    Current Outpatient Medications:     letrozole (FEMARA) 2.5 MG tablet, Take 2.5 mg by mouth daily, Disp: , Rfl:     Fluticasone Furoate-Vilanterol (BREO ELLIPTA) 200-25 MCG/INH AEPB, INHALE ONE DOSE BY MOUTH INTO THE LUNGS DAILY, Disp: 1 each, Rfl: 11    montelukast (SINGULAIR) 10 MG tablet, TAKE ONE TABLET BY MOUTH ONCE NIGHTLY, Disp: 30 tablet, Rfl: 11    albuterol sulfate HFA (VENTOLIN HFA) 108 (90 Base) MCG/ACT inhaler, Inhale 2 puffs into the lungs every 6 hours as needed for Wheezing, Disp: 1 Inhaler, Rfl: 11    sertraline (ZOLOFT) 100 MG tablet, Take 100 mg by mouth daily, Disp: , Rfl:     gabapentin (NEURONTIN) 100 MG capsule, Take 100 mg by mouth daily as needed. , Disp: , Rfl:     Cetirizine HCl (ZYRTEC ALLERGY PO), Take by mouth, Disp: , Rfl:     Pain Plan:  None needed       Patient Currently in Pain: Denies         Immunizations/Smoking Status: There is no immunization history on file for this patient. Social History     Tobacco Use   Smoking Status Former Smoker    Types: Cigarettes    Last attempt to quit: 1997    Years since quittin.1   Smokeless Tobacco Never Used     Counseling given: Not Answered      PHYSICAL FINDINGS:    CHAPERONE: Nurse/MA Present    ECO Asymptomatic      Vital Signs:  /83   Pulse 90   Temp 98.6 °F (37 °C) (Oral)   Resp 18   Wt 195 lb (88.5 kg)   SpO2 98%   BMI 36.25 kg/m²   Wt Readings from Last 5 Encounters:   19 195 lb (88.5 kg)   19 196 lb 3.2 oz (89 kg)   19 195 lb 6.4 oz (88.6 kg)   19 193 lb (87.5 kg)   19 190 lb 6.4 oz (86.4 kg)     The patient is in no acute distress, oriented in time, person and place. Mucous membranes pink, moist and anicteric. Examination of the left breast reveals a well-healed surgical scar, the skin is intact with mildly to moderate erythema and hyperpigmentation. ASSESSMENT PLAN: The patient is tolerating radiation therapy well. We'll continue radiation therapy as planned. The patient will continue moisturizers on the breasts is recommended. Electronically signed by Raman Orosco MD on 2019 at 9:21 300 [facility]     Drugs Prescribed:  New Prescriptions    No medications on file       Other Orders Placed:  No orders of the defined types were placed in this encounter.

## 2019-09-17 ENCOUNTER — HOSPITAL ENCOUNTER (OUTPATIENT)
Dept: RADIATION ONCOLOGY | Age: 54
Discharge: HOME | End: 2019-09-17
Attending: RADIOLOGY
Payer: COMMERCIAL

## 2019-09-17 PROCEDURE — 77412 RADIATION TX DELIVERY LVL 3: CPT | Performed by: RADIOLOGY

## 2019-09-17 PROCEDURE — 77417 THER RADIOLOGY PORT IMAGE(S): CPT | Performed by: RADIOLOGY

## 2019-09-18 ENCOUNTER — HOSPITAL ENCOUNTER (OUTPATIENT)
Dept: RADIATION ONCOLOGY | Age: 54
Discharge: HOME | End: 2019-09-18
Attending: RADIOLOGY
Payer: COMMERCIAL

## 2019-09-18 PROCEDURE — 77412 RADIATION TX DELIVERY LVL 3: CPT | Performed by: RADIOLOGY

## 2019-09-18 PROCEDURE — 77387 GUIDANCE FOR RADJ TX DLVR: CPT | Performed by: RADIOLOGY

## 2019-09-19 ENCOUNTER — HOSPITAL ENCOUNTER (OUTPATIENT)
Dept: RADIATION ONCOLOGY | Age: 54
Discharge: HOME | End: 2019-09-19
Attending: RADIOLOGY
Payer: COMMERCIAL

## 2019-09-19 PROCEDURE — 77412 RADIATION TX DELIVERY LVL 3: CPT | Performed by: RADIOLOGY

## 2019-09-19 PROCEDURE — 77387 GUIDANCE FOR RADJ TX DLVR: CPT | Performed by: RADIOLOGY

## 2019-09-20 ENCOUNTER — HOSPITAL ENCOUNTER (OUTPATIENT)
Dept: RADIATION ONCOLOGY | Age: 54
Discharge: HOME | End: 2019-09-20
Attending: RADIOLOGY
Payer: COMMERCIAL

## 2019-09-20 PROCEDURE — 77412 RADIATION TX DELIVERY LVL 3: CPT | Performed by: RADIOLOGY

## 2019-09-20 PROCEDURE — 77387 GUIDANCE FOR RADJ TX DLVR: CPT | Performed by: RADIOLOGY

## 2019-09-23 ENCOUNTER — HOSPITAL ENCOUNTER (OUTPATIENT)
Dept: RADIATION ONCOLOGY | Age: 54
Discharge: HOME | End: 2019-09-23
Attending: RADIOLOGY
Payer: COMMERCIAL

## 2019-09-23 VITALS
SYSTOLIC BLOOD PRESSURE: 128 MMHG | DIASTOLIC BLOOD PRESSURE: 83 MMHG | OXYGEN SATURATION: 98 % | TEMPERATURE: 98 F | WEIGHT: 194.5 LBS | RESPIRATION RATE: 14 BRPM | HEART RATE: 100 BPM | BODY MASS INDEX: 36.16 KG/M2

## 2019-09-23 DIAGNOSIS — Z17.0 MALIGNANT NEOPLASM OF LOWER-INNER QUADRANT OF LEFT BREAST IN FEMALE, ESTROGEN RECEPTOR POSITIVE (HCC): Primary | ICD-10-CM

## 2019-09-23 DIAGNOSIS — C50.312 MALIGNANT NEOPLASM OF LOWER-INNER QUADRANT OF LEFT BREAST IN FEMALE, ESTROGEN RECEPTOR POSITIVE (HCC): Primary | ICD-10-CM

## 2019-09-23 PROCEDURE — 77412 RADIATION TX DELIVERY LVL 3: CPT | Performed by: RADIOLOGY

## 2019-09-23 PROCEDURE — 77387 GUIDANCE FOR RADJ TX DLVR: CPT | Performed by: RADIOLOGY

## 2019-09-23 PROCEDURE — 77336 RADIATION PHYSICS CONSULT: CPT | Performed by: RADIOLOGY

## 2019-09-24 ENCOUNTER — HOSPITAL ENCOUNTER (OUTPATIENT)
Dept: RADIATION ONCOLOGY | Age: 54
Discharge: HOME | End: 2019-09-24
Attending: RADIOLOGY
Payer: COMMERCIAL

## 2019-09-24 ENCOUNTER — HOSPITAL ENCOUNTER (OUTPATIENT)
Dept: ULTRASOUND IMAGING | Age: 54
Discharge: HOME | End: 2019-09-26
Payer: COMMERCIAL

## 2019-09-24 DIAGNOSIS — R10.32 LEFT LOWER QUADRANT PAIN: ICD-10-CM

## 2019-09-24 DIAGNOSIS — R10.31 RIGHT LOWER QUADRANT PAIN: ICD-10-CM

## 2019-09-24 PROCEDURE — 76856 US EXAM PELVIC COMPLETE: CPT

## 2019-09-24 PROCEDURE — 77417 THER RADIOLOGY PORT IMAGE(S): CPT | Performed by: RADIOLOGY

## 2019-09-24 PROCEDURE — 77412 RADIATION TX DELIVERY LVL 3: CPT | Performed by: RADIOLOGY

## 2019-09-24 PROCEDURE — 76830 TRANSVAGINAL US NON-OB: CPT

## 2019-09-25 ENCOUNTER — HOSPITAL ENCOUNTER (OUTPATIENT)
Dept: RADIATION ONCOLOGY | Age: 54
Discharge: HOME | End: 2019-09-25
Attending: RADIOLOGY
Payer: COMMERCIAL

## 2019-09-25 ENCOUNTER — HOSPITAL ENCOUNTER (OUTPATIENT)
Dept: RADIATION ONCOLOGY | Age: 54
End: 2019-09-25
Attending: RADIOLOGY
Payer: COMMERCIAL

## 2019-09-25 PROCEDURE — 77334 RADIATION TREATMENT AID(S): CPT | Performed by: RADIOLOGY

## 2019-09-25 PROCEDURE — 77280 THER RAD SIMULAJ FIELD SMPL: CPT | Performed by: RADIOLOGY

## 2019-09-25 PROCEDURE — 77412 RADIATION TX DELIVERY LVL 3: CPT | Performed by: RADIOLOGY

## 2019-09-25 PROCEDURE — 77307 TELETHX ISODOSE PLAN CPLX: CPT | Performed by: RADIOLOGY

## 2019-09-25 PROCEDURE — 77387 GUIDANCE FOR RADJ TX DLVR: CPT | Performed by: RADIOLOGY

## 2019-09-26 ENCOUNTER — HOSPITAL ENCOUNTER (OUTPATIENT)
Dept: RADIATION ONCOLOGY | Age: 54
Discharge: HOME | End: 2019-09-26
Attending: RADIOLOGY
Payer: COMMERCIAL

## 2019-09-26 PROCEDURE — 77387 GUIDANCE FOR RADJ TX DLVR: CPT | Performed by: RADIOLOGY

## 2019-09-26 PROCEDURE — 77412 RADIATION TX DELIVERY LVL 3: CPT | Performed by: RADIOLOGY

## 2019-09-27 ENCOUNTER — HOSPITAL ENCOUNTER (OUTPATIENT)
Dept: RADIATION ONCOLOGY | Age: 54
Discharge: HOME | End: 2019-09-27
Attending: RADIOLOGY
Payer: COMMERCIAL

## 2019-09-27 PROCEDURE — 77387 GUIDANCE FOR RADJ TX DLVR: CPT | Performed by: RADIOLOGY

## 2019-09-27 PROCEDURE — 77412 RADIATION TX DELIVERY LVL 3: CPT | Performed by: RADIOLOGY

## 2019-09-30 ENCOUNTER — HOSPITAL ENCOUNTER (OUTPATIENT)
Dept: RADIATION ONCOLOGY | Age: 54
Discharge: HOME | End: 2019-09-30
Attending: RADIOLOGY
Payer: COMMERCIAL

## 2019-09-30 VITALS
WEIGHT: 193.4 LBS | TEMPERATURE: 98 F | OXYGEN SATURATION: 98 % | SYSTOLIC BLOOD PRESSURE: 117 MMHG | RESPIRATION RATE: 80 BRPM | BODY MASS INDEX: 35.95 KG/M2 | DIASTOLIC BLOOD PRESSURE: 78 MMHG

## 2019-09-30 DIAGNOSIS — C50.312 MALIGNANT NEOPLASM OF LOWER-INNER QUADRANT OF LEFT BREAST IN FEMALE, ESTROGEN RECEPTOR POSITIVE (HCC): Primary | ICD-10-CM

## 2019-09-30 DIAGNOSIS — Z17.0 MALIGNANT NEOPLASM OF LOWER-INNER QUADRANT OF LEFT BREAST IN FEMALE, ESTROGEN RECEPTOR POSITIVE (HCC): Primary | ICD-10-CM

## 2019-09-30 PROCEDURE — 77387 GUIDANCE FOR RADJ TX DLVR: CPT | Performed by: RADIOLOGY

## 2019-09-30 PROCEDURE — 77336 RADIATION PHYSICS CONSULT: CPT | Performed by: RADIOLOGY

## 2019-09-30 PROCEDURE — 77412 RADIATION TX DELIVERY LVL 3: CPT | Performed by: RADIOLOGY

## 2019-10-01 ENCOUNTER — HOSPITAL ENCOUNTER (OUTPATIENT)
Dept: RADIATION ONCOLOGY | Age: 54
Discharge: HOME | End: 2019-10-01
Attending: RADIOLOGY
Payer: COMMERCIAL

## 2019-10-01 PROCEDURE — 77387 GUIDANCE FOR RADJ TX DLVR: CPT | Performed by: RADIOLOGY

## 2019-10-01 PROCEDURE — 77412 RADIATION TX DELIVERY LVL 3: CPT | Performed by: RADIOLOGY

## 2019-11-04 ENCOUNTER — TELEPHONE (OUTPATIENT)
Dept: ONCOLOGY | Age: 54
End: 2019-11-04

## 2020-01-02 ENCOUNTER — HOSPITAL ENCOUNTER (OUTPATIENT)
Dept: RADIATION ONCOLOGY | Age: 55
Discharge: HOME | End: 2020-01-02
Attending: RADIOLOGY
Payer: COMMERCIAL

## 2020-01-02 VITALS
TEMPERATURE: 98 F | OXYGEN SATURATION: 98 % | DIASTOLIC BLOOD PRESSURE: 79 MMHG | BODY MASS INDEX: 35.88 KG/M2 | SYSTOLIC BLOOD PRESSURE: 113 MMHG | HEART RATE: 83 BPM | RESPIRATION RATE: 18 BRPM | WEIGHT: 193 LBS

## 2020-01-02 PROCEDURE — 99212 OFFICE O/P EST SF 10 MIN: CPT | Performed by: RADIOLOGY

## 2020-01-02 NOTE — PROGRESS NOTES
[address]            Radiation Oncology          [address] [facility]          [place] [facility] [address]        Joan [phone]        F: [phone]       [email or web address]         Date of Service: 2020     Location:  [address],   [address] [facility], [place]   [phone]        RADIATION ONCOLOGY FOLLOW UP NOTE    Patient ID:   Raffi Snyder  : 1965   MRN: [id number]    DIAGNOSIS:  Cancer Staging  Malignant neoplasm of lower-inner quadrant of left breast in female, estrogen receptor positive ([facility] [address]  Staging form: Breast, AJCC 8th Edition  - Clinical: No stage assigned - Unsigned  - Pathologic stage from 2019: Stage IIA (pT2, pN0(sn), cM0, G2, ER+, [place]-, HER2-, Oncotype DX score: 30) - Signed by Allison Scott MD on 2019      INTERVAL HISTORY:   Patient returns to the clinic for her first posttreatment follow-up. She is doing well since completing her radiation treatment. She denies new lumps or bumps. She reports occasional tenderness in the left breast.  She denies limited range of motion of the left upper extremity. She was started on hormonal therapy and reports hot flashes but no other complaints. MEDICATIONS:    Current Outpatient Medications:     letrozole (FEMARA) 2.5 MG tablet, Take 2.5 mg by mouth daily, Disp: , Rfl:     Fluticasone Furoate-Vilanterol (BREO ELLIPTA) 200-25 MCG/INH AEPB, INHALE ONE DOSE BY MOUTH INTO THE LUNGS DAILY, Disp: 1 each, Rfl: 11    montelukast (SINGULAIR) 10 MG tablet, TAKE ONE TABLET BY MOUTH ONCE NIGHTLY, Disp: 30 tablet, Rfl: 11    albuterol sulfate HFA (VENTOLIN HFA) 108 (90 Base) MCG/ACT inhaler, Inhale 2 puffs into the lungs every 6 hours as needed for Wheezing, Disp: 1 Inhaler, Rfl: 11    sertraline (ZOLOFT) 100 MG tablet, Take 100 mg by mouth daily, Disp: , Rfl:     gabapentin (NEURONTIN) 100 MG capsule, Take 100 mg by mouth daily as needed.  , Disp: ,
N/A:  []         FALLS RISK SCREEN  Instructions:  Assess the patient and enter the appropriate indicators that are present for fall risk identification. Total the numbers entered and assign a fall risk score from Table 2.  Reassess patient at a minimum every 12 weeks or with status change. Assessment   Date  1/2/2020     1. Mental Ability: confusion/cognitively impaired 0     2. Elimination Issues: incontinence, frequency 0       3. Ambulatory: use of assistive devices (walker, cane, off-loading devices),        attached to equipment (IV pole, oxygen) 0     4. Sensory Limitations: dizziness, vertigo, impaired vision 0     5. Age less than 65        0     6. Age 72 or greater 0     7. Medication: diuretics, strong analgesics, hypnotics, sedatives,        antihypertensive agents 0   8. Falls:  recent history of falls within the last 3 months (not to include slipping or        tripping) 0   TOTAL 0    If score of 4 or greater was education given? No           TABLE 2   Risk Score Risk Level Plan of Care   0-3 Little or  No Risk 1. Provide assistance as indicated for ambulation activities  2. Reorient confused/cognitively impaired patient  3. Chair/bed in low position, stretcher/bed with siderails up except when performing patient care activities  5. Educate patient/family/caregiver on falls prevention  6.  Reassess in 12 weeks or with any noted change in patient condition which places them at a risk for a fall   4-6 Moderate Risk 1. Provide assistance as indicated for ambulation activities  2. Reorient confused/cognitively impaired patient  3. Chair/bed in low position, stretcher/bed with siderails up except when performing patient care activities  4. Educate patient/family/caregiver on falls prevention     7 or   Higher High Risk 1. Place patient in easily observable treatment room  2. Patient attended at all times by family member or staff  3.   Provide assistance as indicated for ambulation
22-Aug-2019 12:30

## 2020-07-09 ENCOUNTER — HOSPITAL ENCOUNTER (OUTPATIENT)
Dept: RADIATION ONCOLOGY | Age: 55
Discharge: HOME | End: 2020-07-09
Attending: RADIOLOGY
Payer: COMMERCIAL

## 2020-07-09 VITALS
BODY MASS INDEX: 35.13 KG/M2 | HEART RATE: 90 BPM | SYSTOLIC BLOOD PRESSURE: 125 MMHG | RESPIRATION RATE: 18 BRPM | OXYGEN SATURATION: 98 % | WEIGHT: 189 LBS | DIASTOLIC BLOOD PRESSURE: 85 MMHG | TEMPERATURE: 98 F

## 2020-07-09 PROCEDURE — 99213 OFFICE O/P EST LOW 20 MIN: CPT | Performed by: RADIOLOGY

## 2020-07-09 PROCEDURE — 99212 OFFICE O/P EST SF 10 MIN: CPT | Performed by: RADIOLOGY

## 2020-07-09 NOTE — PROGRESS NOTES
[facility]  7/9/2020  9:26 AM      Vitals:    07/09/20 0915   BP: 125/85   Pulse: 90   Resp: 18   Temp: 98 °F (36.7 °C)   SpO2: 98%    :  Patient Currently in Pain: Denies             Wt Readings from Last 1 Encounters:   07/09/20 189 lb (85.7 kg)                Current Outpatient Medications:     letrozole (FEMARA) 2.5 MG tablet, Take 2.5 mg by mouth daily, Disp: , Rfl:     Fluticasone Furoate-Vilanterol (BREO ELLIPTA) 200-25 MCG/INH AEPB, INHALE ONE DOSE BY MOUTH INTO THE LUNGS DAILY, Disp: 1 each, Rfl: 11    montelukast (SINGULAIR) 10 MG tablet, TAKE ONE TABLET BY MOUTH ONCE NIGHTLY, Disp: 30 tablet, Rfl: 11    albuterol sulfate HFA (VENTOLIN HFA) 108 (90 Base) MCG/ACT inhaler, Inhale 2 puffs into the lungs every 6 hours as needed for Wheezing, Disp: 1 Inhaler, Rfl: 11    sertraline (ZOLOFT) 100 MG tablet, Take 100 mg by mouth daily, Disp: , Rfl:     gabapentin (NEURONTIN) 100 MG capsule, Take 100 mg by mouth daily as needed.  , Disp: , Rfl:     Cetirizine HCl (ZYRTEC ALLERGY PO), Take by mouth, Disp: , Rfl:     Immunizations:    Influenza status:    []   Current   [x]   Patient declined    Pneumococcal status:  []   Current  [x]   Patient declined    Smoking Status:    [] Smoker - PPD:  Smoking cessation education: Provided []   Declined []    [x] Nonsmoker - Quit [facility]:[id number]               [] Never a smoker           Cancer Screening:  Colonoscopy [x] Current [] Not current   [] Not current, but scheduled   [] NA  Mammogram [] Current [] Not current   [] Not current, but scheduled   [] NA  Prostate [] Current [] Not current   [] Not current, but scheduled   [x] NA  PAP/Pelvic [x] Current [] Not current   [] Not current, but scheduled   [] NA  Skin  [] Current  [x] Not current   [] Not current, but scheduled   [] NA    Hormone:  Lupron []   Last dose given:           Next dose due:   Eligard []   Last dose given:           Next dose due:   Aromatase Inhibitors [x]   Medication name: Letrozole N/A:  []           FALLS RISK SCREEN  Instructions:  Assess the patient and enter the appropriate indicators that are present for fall risk identification. Total the numbers entered and assign a fall risk score from Table 2.  Reassess patient at a minimum every 12 weeks or with status change. Assessment   Date  7/9/2020     1. Mental Ability: confusion/cognitively impaired 0     2. Elimination Issues: incontinence, frequency 0       3. Ambulatory: use of assistive devices (walker, cane, off-loading devices),        attached to equipment (IV pole, oxygen) 0     4. Sensory Limitations: dizziness, vertigo, impaired vision 0     5. Age less than 65        0     6. Age 72 or greater 0     7. Medication: diuretics, strong analgesics, hypnotics, sedatives,        antihypertensive agents 0   8. Falls:  recent history of falls within the last 3 months (not to include slipping or        tripping) 0   TOTAL 0    If score of 4 or greater was education given? No           TABLE 2   Risk Score Risk Level Plan of Care   0-3 Little or  No Risk 1. Provide assistance as indicated for ambulation activities  2. Reorient confused/cognitively impaired patient  3. Chair/bed in low position, stretcher/bed with siderails up except when performing patient care activities  5. Educate patient/family/caregiver on falls prevention  6.  Reassess in 12 weeks or with any noted change in patient condition which places them at a risk for a fall   4-6 Moderate Risk 1. Provide assistance as indicated for ambulation activities  2. Reorient confused/cognitively impaired patient  3. Chair/bed in low position, stretcher/bed with siderails up except when performing patient care activities  4. Educate patient/family/caregiver on falls prevention     7 or   Higher High Risk 1. Place patient in easily observable treatment room  2. Patient attended at all times by family member or staff  3.   Provide assistance as indicated for ambulation activities  4. Reorient confused/cognitively impaired patient  5. Chair/bed in low position, stretcher/bed with siderails up except when performing patient care activities  6. Educate patient/family/caregiver on falls prevention         PLAN: Patient is seen today in follow up. Dr Ivan Stephen notified and examined pt. Pt to f/u with YASHIRA العراقي

## 2020-07-10 NOTE — PROGRESS NOTES
[address]            Radiation Oncology          [address] [facility]          [place] [address]        [place] Maid: [phone]        F: [phone]       [email or web address]         Date of Service: 2020     Location:  [facility],   [address] [facility], [place] [address]   [phone]        RADIATION ONCOLOGY FOLLOW UP NOTE    Patient ID:   Rachana Zambrano Mcnultycldax  : 1965   MRN: [id number]    DIAGNOSIS:  Cancer Staging  Malignant neoplasm of lower-inner quadrant of left breast in female, estrogen receptor positive ([facility] [address]  Staging form: Breast, AJCC 8th Edition  - Clinical: No stage assigned - Unsigned  - Pathologic stage from 2019: Stage IIA (pT2, pN0(sn), cM0, G2, ER+, [place]-, HER2-, Oncotype DX score: 30) - Signed by Norma Watts MD on 2019    -s/p lump SLN + re-excision   -s/p Oncotype 30 - adj chemo TC x4  -s/p adj RT 4256cGy+1000cGy boost 10/1/19  -on Letrozole    INTERVAL HISTORY:   Ms Chucho Dangelo is a 69-year-old female with a history of a left-sided breast cancer initially diagnosed in  for which she underwent a lumpectomy followed by adjuvant chemotherapy and adjuvant radiation. Patient completed her adjuvant radiation approximately 9 months ago in 2019. Patient did have a bilateral mammogram in November of last year that was negative as well as a recent mammogram of the left breast on 2020 at [facility] which was also negative. She continues to do well without having any abnormalities on her self breast exams. She denies any new lumps bumps skin changes or nipple discharge. She denies any swelling or lymphedema in the left arm, or difficulty range of motion. She denies any acute symptoms of headaches, dizziness, chest pain, shortness of breath, abdominal pain, nausea, bony pain, weight loss, or fatigue.   She does continue to take letrozole for endocrine therapy and is tolerating it well with some hot flashes. MEDICATIONS:    Current Outpatient Medications:     letrozole (FEMARA) 2.5 MG tablet, Take 2.5 mg by mouth daily, Disp: , Rfl:     Fluticasone Furoate-Vilanterol (BREO ELLIPTA) 200-25 MCG/INH AEPB, INHALE ONE DOSE BY MOUTH INTO THE LUNGS DAILY, Disp: 1 each, Rfl: 11    montelukast (SINGULAIR) 10 MG tablet, TAKE ONE TABLET BY MOUTH ONCE NIGHTLY, Disp: 30 tablet, Rfl: 11    albuterol sulfate HFA (VENTOLIN HFA) 108 (90 Base) MCG/ACT inhaler, Inhale 2 puffs into the lungs every 6 hours as needed for Wheezing, Disp: 1 Inhaler, Rfl: 11    sertraline (ZOLOFT) 100 MG tablet, Take 100 mg by mouth daily, Disp: , Rfl:     gabapentin (NEURONTIN) 100 MG capsule, Take 100 mg by mouth daily as needed. , Disp: , Rfl:     Cetirizine HCl (ZYRTEC ALLERGY PO), Take by mouth, Disp: , Rfl:     ALLERGIES:  Allergies   Allergen Reactions    Erythromycin Other (See Comments)     Vomiting         REVIEW OF SYSTEMS:    A full 14 point review of systems was performed and assessed and found to be negative except as noted above. PHYSICAL EXAMINATION:    CHAPERONE: Not Required    ECO Asymptomatic    VITAL SIGNS: /85   Pulse 90   Temp 98 °F (36.7 °C)   Resp 18   Wt 189 lb (85.7 kg)   SpO2 98%   BMI 35.13 kg/m²   GENERAL:  General appearance is that of a well-nourished, well-developed in no apparent distress. HEENT: Normocephalic, atraumatic, EOMI, moist mucosa, no erythema. NECK:  No adenopathy or a palpable thyroid mass, trachea is midline. HEART:  Regular rate and rhythm, S1, S2, no murmurs. LUNGS:  Clear to auscultation bilaterally with no wheezing or crackles. ABDOMEN:  Soft, nontender, non distended. EXTREMITIES:  No clubbing, cyanosis, or edema. No calf tenderness. MSK:  No CVA or spinal tenderness. NEUROLOGICAL: No focal deficits. CN II-XII intact. Strength and sensation intact bilaterally. SKIN: No erythema or desquamation.       LABS:  WBC   Date Value Ref Range Status   2018 NOT REPORTED k/uL Final     No results found for: , CEA  No results found for: PSA    IMAGIN20 L Mammogram/US (Outside records scanned in Media)  Impression:  1. No mammographic or ultrasound findings suspicious for malignancy. 2 clinical follow-up of patient's symptoms is recommended. 3 BI-RADS 2 benign findings  4. Bilateral screening mammogram recommended in 2020.    19 L Mammogram (Outside records scanned in Media)  Impression:  1. Postsurgical changes seen in left breast.  2. No discrete masses identified in the right breast.  Patient is sent to the ultrasound department for further relation of the right breast pathology. Follow-up: Yearly screening mammograms are recommended. ASSESSMENT AND PLAN:  Berenice Fletcher is a 48 y.o. female with a Cancer Staging  Malignant neoplasm of lower-inner quadrant of left breast in female, estrogen receptor positive ([facility] [address]  Staging form: Breast, AJCC 8th Edition  - Clinical: No stage assigned - Unsigned  - Pathologic stage from 2019: Stage IIA (pT2, pN0(sn), cM0, G2, ER+, [place]-, HER2-, Oncotype DX score: 30) - Signed by Valentino Hazy, MD on 2019  -s/p lump SLN + re-excision   -s/p Oncotype 30 - adj chemo TC x4  -s/p adj RT 4256cGy+1000cGy boost 10/1/19  -on Letrozole    Patient comes in today for a six-month follow-up visit approximately 9 months after completion of her definitive radiation treatment for her left-sided breast cancer. Overall she is doing well with no acute complaints. Her recent left-sided mammogram was negative and she is scheduled for her bilateral mammogram again this upcoming November. Patient will be seeing her medical oncologist Dr. Cassie Cervantes who is monitoring her endocrine therapy as well as her surveillance imaging.   Patient overall is doing well and therefore was given the option to continue close follow-up with us or to see us on an as-needed basis as she will be seeing medical oncology more routinely. Patient elected to see us as needed and does have our contact information should she have any questions concerns or changes in symptoms in the future. Patient was in agreement with my recommendations. All questions were answered to their satisfaction. Patient was advised to contact us anytime should they have any questions or concerns. Electronically signed by Tory Weaver MD on 7/10/2020 at 2:20 PM        Medications Prescribed:   New Prescriptions    No medications on file       Orders: No orders of the defined types were placed in this encounter.       CC:  Patient Care Team:  Sony Rangel DO as PCP - General (Family Medicine)  Mariza Suggs DO as Consulting Physician (Pulmonology)

## 2020-08-04 RX ORDER — MONTELUKAST SODIUM 10 MG/1
TABLET ORAL
Qty: 30 TABLET | Refills: 1 | Status: SHIPPED | OUTPATIENT
Start: 2020-08-04 | End: 2020-10-05

## 2020-08-04 NOTE — TELEPHONE ENCOUNTER
Neymar Parikh, patient is current and due in for an appointment on 8/31/20. Per last dictation patient is on this medication. Please sign for refill if ok. Thank you.

## 2020-08-31 ENCOUNTER — VIRTUAL VISIT (OUTPATIENT)
Dept: PULMONOLOGY | Age: 55
End: 2020-08-31
Payer: COMMERCIAL

## 2020-08-31 PROCEDURE — 99213 OFFICE O/P EST LOW 20 MIN: CPT | Performed by: INTERNAL MEDICINE

## 2020-08-31 ASSESSMENT — ENCOUNTER SYMPTOMS
RESPIRATORY NEGATIVE: 1
EYES NEGATIVE: 1
GASTROINTESTINAL NEGATIVE: 1

## 2020-08-31 NOTE — PROGRESS NOTES
2020    TELEHEALTH EVALUATION -- Audio/Visual (During OCZLG-14 public health emergency)    Patient and physician are located in their individual locations. This is visit is completed via IP[employer] application []/ Doxy. me[] / Telephone []     HPI:    Kesha Easton (:  1965) has requested an audio/video evaluation for the following concern(s):    Follow-up visit for severe persistent asthma. Since her last visit 6 months ago her asthma is been under excellent control. She remains on Symbicort and montelukast.  Rarely uses albuterol. Denies nocturnal symptoms. Typically uses her in for inhaler prior to exercise. Previous IgE and eosinophil counts were low      Review of Systems   Constitutional: Negative. HENT: Negative. Eyes: Negative. Respiratory: Negative. Cardiovascular: Negative. Gastrointestinal: Negative. All other systems reviewed and are negative. Prior to Visit Medications    Medication Sig Taking? Authorizing Provider   Fluticasone furoate-vilanterol (BREO ELLIPTA) 200-25 MCG/INH AEPB inhaler INHALE ONE DOSE BY MOUTH DAILY Yes CHAYO Lehman CNP   montelukast (SINGULAIR) 10 MG tablet TAKE ONE TABLET BY MOUTH ONCE NIGHTLY Yes CHAYO Lehman CNP   letrozole ([address]) 2.5 MG tablet Take 2.5 mg by mouth daily Yes Historical Provider, MD   albuterol sulfate HFA (VENTOLIN HFA) 108 (90 Base) MCG/ACT inhaler Inhale 2 puffs into the lungs every 6 hours as needed for Wheezing Yes CHAYO Lehman CNP   sertraline (ZOLOFT) 100 MG tablet Take 100 mg by mouth daily Yes Historical Provider, MD   gabapentin (NEURONTIN) 100 MG capsule Take 100 mg by mouth daily as needed.   Yes Historical Provider, MD   Cetirizine HCl (ZYRTEC ALLERGY PO) Take by mouth Yes Historical Provider, MD       Social History     Tobacco Use    Smoking status: Former Smoker     Types: Cigarettes     Last attempt to quit: 1997     Years since quittin.0    Smokeless tobacco: Never Used   Substance Use Topics    Alcohol use: Yes     Alcohol/week: 2.0 standard drinks     Types: 2 Glasses of wine per week     Comment: Per day    Drug use: No            RECORD REVIEW: Previous medical records were reviewed at today's visit. Wt Readings from Last 3 Encounters:   07/09/20 189 lb (85.7 kg)   01/02/20 193 lb (87.5 kg)   09/30/19 193 lb 6.4 oz (87.7 kg)       Results for orders placed or performed during the hospital encounter of 04/30/18   Eosinophil Count   Result Value Ref Range    WBC NOT REPORTED k/uL    Eosinophils % NOT REPORTED %    Eosinophils Absolute 78 (L) 200 - 400 /uL   IgE   Result Value Ref Range    IgE 67 <101 IU/mL       No results found. PHYSICAL EXAMINATION:  Due to this being a TeleHealth encounter, evaluation of the following organ systems is limited: Vitals/Constitutional/EENT/Resp/CV/GI//MS/Neuro/Skin/Heme-Lymph-Imm. Constitutional: [x] Appears well-developed and well-nourished. [x] Abnormal Obese  Mental status  [x] Alert and awake  [x] Oriented to person/place/time [x]Able to follow commands    [x] No apparent distress      Eyes:  EOM    [x]  Normal  [] Abnormal-  Sclera  [x]  Normal  [] Abnormal -         Discharge [x]  None visible  [] Abnormal -    HENT:   [x] Normocephalic, atraumatic. [] Abnormal shaped head   [x] Mouth/Throat: Mucous membranes are moist.     Ears [x] Normal  [] Abnormal-    Neck: [x] Normal range of motion [x] Supple [x] No visualized mass. Pulmonary/Chest: [x] Respiratory effort normal.  [x] No visualized signs of difficulty breathing or respiratory distress        [] Abnormal      Musculoskeletal:   [x] Normal range of motion. [x] Normal gait with no signs of ataxia. [x]  No signs of cyanosis of the peripheral portions of extremities.          [] Abnormal       Neurological:        [x] Normal cranial nerve (limited exam to video visit) [x] No focal weakness observed       [] Abnormal          Speech       [x] Normal [] Abnormal     Skin:        [x] No rash on visible skin  [x] Normal  [] Abnormal     Psychiatric:       [x] Normal  [] Abnormal        [x] Normal Mood  [] Anxious appearing        Other Pertinent Exam Findings:       ASSESSMENT:  1. Asthma, well controlled, severe persistent    2. Multiple environmental allergies    3. Malignant neoplasm of lower-inner quadrant of left breast in female, estrogen receptor positive (HCC)    4. Class 2 severe obesity due to excess calories with serious comorbidity and body mass index (BMI) of 35.0 to 35.9 in adult [facility])          Plan:   1. Continue same treatment for asthma. 2. Discussed strategies for management of asthma including escalation and de-escalation of therapy. 3. Flu shot in fall. 4. Weight loss. Will help with asthma control. 5. Discussed strategies to mitigate risk of COVID-19 infection. 6. Return in 1 year. An  electronic signature was used to authenticate this note. --Lily Palma DO on 8/31/2020 at 6:06 PM    9}    Pursuant to the emergency declaration under the [facility] waiver authority and the [employer] Act, this Virtual  Visit was conducted, with patient's consent, to reduce the patient's risk of exposure to COVID-19 and provide continuity of care for an established patient. Services were provided through a video synchronous discussion virtually to substitute for in-person clinic visit.

## 2020-10-05 RX ORDER — MONTELUKAST SODIUM 10 MG/1
TABLET ORAL
Qty: 30 TABLET | Refills: 10 | Status: SHIPPED | OUTPATIENT
Start: 2020-10-05 | End: 2021-08-31 | Stop reason: SDUPTHER

## 2020-10-07 NOTE — TELEPHONE ENCOUNTER
Dr David Hernandez, patient is current and due in for an appointment on 8/31/21. Per your last dictation patient remains on Symbicort but Breo last prescribed on 8/4/20 with one refill. Please sign for refill if ok. Thank you.

## 2021-08-31 ENCOUNTER — VIRTUAL VISIT (OUTPATIENT)
Dept: PULMONOLOGY | Age: 56
End: 2021-08-31
Payer: COMMERCIAL

## 2021-08-31 DIAGNOSIS — C50.312 MALIGNANT NEOPLASM OF LOWER-INNER QUADRANT OF LEFT BREAST IN FEMALE, ESTROGEN RECEPTOR POSITIVE (HCC): ICD-10-CM

## 2021-08-31 DIAGNOSIS — J45.50 ASTHMA, WELL CONTROLLED, SEVERE PERSISTENT: Primary | ICD-10-CM

## 2021-08-31 DIAGNOSIS — Z91.09 MULTIPLE ENVIRONMENTAL ALLERGIES: ICD-10-CM

## 2021-08-31 DIAGNOSIS — E66.01 CLASS 2 SEVERE OBESITY DUE TO EXCESS CALORIES WITH SERIOUS COMORBIDITY AND BODY MASS INDEX (BMI) OF 35.0 TO 35.9 IN ADULT (HCC): ICD-10-CM

## 2021-08-31 DIAGNOSIS — Z17.0 MALIGNANT NEOPLASM OF LOWER-INNER QUADRANT OF LEFT BREAST IN FEMALE, ESTROGEN RECEPTOR POSITIVE (HCC): ICD-10-CM

## 2021-08-31 PROCEDURE — 99441 PR PHYS/QHP TELEPHONE EVALUATION 5-10 MIN: CPT | Performed by: INTERNAL MEDICINE

## 2021-08-31 RX ORDER — ALBUTEROL SULFATE 90 UG/1
2 AEROSOL, METERED RESPIRATORY (INHALATION) EVERY 6 HOURS PRN
Qty: 1 INHALER | Refills: 11 | Status: SHIPPED | OUTPATIENT
Start: 2021-08-31 | End: 2022-08-29 | Stop reason: SDUPTHER

## 2021-08-31 RX ORDER — MONTELUKAST SODIUM 10 MG/1
10 TABLET ORAL NIGHTLY
Qty: 90 TABLET | Refills: 3 | Status: SHIPPED | OUTPATIENT
Start: 2021-08-31 | End: 2022-08-26

## 2021-08-31 ASSESSMENT — ENCOUNTER SYMPTOMS
RESPIRATORY NEGATIVE: 1
GASTROINTESTINAL NEGATIVE: 1
EYES NEGATIVE: 1

## 2021-08-31 NOTE — PROGRESS NOTES
2021    TELEHEALTH EVALUATION -- Audio/Visual (During MINBQ-03 public health emergency)    Patient and physician are located in their individual locations. This is visit is completed via [employer] application []/ [employer] me[] / Telephone [x]      HPI:    Michelet Foley (:  1965) has requested an audio/video evaluation for the following concern(s):    Virtual visit follow-up for asthma. Patient states that she is currently in the [facility] in [place]. Asthma has been under excellent control over the last year. Remains on Breo 200 mcg, montelukast 10 mg nightly, and Ventolin HFA as needed. Rarely uses Ventolin (twice a month). Denies exercise-induced or nocturnal symptoms. No need for steroids over the last year. No new health problems. Dr. Fernanda Luis note from  reviewed. Patient remains in remission from breast cancer. Review of Systems   Constitutional: Negative. HENT: Negative. Eyes: Negative. Respiratory: Negative. Cardiovascular: Negative. Gastrointestinal: Negative. All other systems reviewed and are negative. Prior to Visit Medications    Medication Sig Taking? Authorizing Provider   Fluticasone furoate-vilanterol (BREO ELLIPTA) 200-25 MCG/INH AEPB inhaler Inhale 1 puff into the lungs daily Yes Sudheer Patino DO   montelukast (SINGULAIR) 10 MG tablet Take 1 tablet by mouth nightly Yes Sudheer Patino DO   albuterol sulfate HFA (VENTOLIN HFA) 108 (90 Base) MCG/ACT inhaler Inhale 2 puffs into the lungs every 6 hours as needed for Wheezing Yes Marbella Patino DO   letrozole (FEMARA) 2.5 MG tablet Take 2.5 mg by mouth daily Yes Historical Provider, MD   sertraline (ZOLOFT) 100 MG tablet Take 100 mg by mouth daily Yes Historical Provider, MD   gabapentin (NEURONTIN) 100 MG capsule Take 100 mg by mouth daily as needed.   Yes Historical Provider, MD   Cetirizine HCl (ZYRTEC ALLERGY PO) Take by mouth Yes Historical Provider, MD       Social History     Tobacco Use    Smoking status: Former Smoker     Types: Cigarettes     Quit date: 1997     Years since quittin.0    Smokeless tobacco: Never Used   Substance Use Topics    Alcohol use: Yes     Alcohol/week: 2.0 standard drinks     Types: 2 Glasses of wine per week     Comment: Per day    Drug use: No            RECORD REVIEW: Previous medical records were reviewed at today's visit. Wt Readings from Last 3 Encounters:   20 189 lb (85.7 kg)   20 193 lb (87.5 kg)   19 193 lb 6.4 oz (87.7 kg)       Results for orders placed or performed during the hospital encounter of 18   Eosinophil Count   Result Value Ref Range    WBC NOT REPORTED k/uL    Eosinophils % NOT REPORTED %    Eosinophils Absolute 78 (L) 200 - 400 /uL   IgE   Result Value Ref Range    IgE 67 <101 IU/mL       No results found. PHYSICAL EXAMINATION:  Due to this being a TeleHealth encounter, evaluation of the following organ systems is limited:     ASSESSMENT:  1. Asthma, well controlled, severe persistent    2. Class 2 severe obesity due to excess calories with serious comorbidity and body mass index (BMI) of 35.0 to 35.9 in adult ([facility] [address]    3. Multiple environmental allergies    4. Malignant neoplasm of lower-inner quadrant of left breast in female, estrogen receptor positive ([facility] [address]          Plan:   1. Refilled asthma medications. 2. Discussed strategies for management of asthma including escalation and de-escalation of therapy. 3. Weight loss. Will help asthma control. 4. Avoid environmental triggers. 5. Return in 1 year. An  electronic signature was used to authenticate this note.     --Jg Siddiqi, DO on 2021 at 6:06 PM    9}    Pursuant to the emergency declaration under the [facility] waiver authority and the [employer] Act, this Virtual  Visit was conducted, with patient's consent, to reduce the patient's risk of exposure to COVID-19 and provide continuity of care for an established patient. Services were provided through a video synchronous discussion virtually to substitute for in-person clinic visit.     _______________________________________________________________________________________________________________________________________________  FOR TELEPHONE VISITS PLEASE COMPLETE THE FOLLOWING      Consent:  She and/or health care decision maker is aware that that she may receive a bill for this telephone service, depending on her insurance coverage, and has provided verbal consent to proceed: Yes      I affirm this is a Patient Initiated Episode with an Established Patient who has not had a related appointment within my department in the past 7 days or scheduled within the next 24 hours.     Total Time: minutes: 5-10 minutes    Note: not billable if this call serves to triage the patient into an appointment for the relevant concern

## 2022-08-25 NOTE — TELEPHONE ENCOUNTER
LAST VISIT: 8/31/21  NEXT VISIT: 8/29/22    Per last dictation patient is on these medications. Please sign for refills if ok. Thank you.

## 2022-08-26 RX ORDER — MONTELUKAST SODIUM 10 MG/1
TABLET ORAL
Qty: 90 TABLET | Refills: 0 | Status: SHIPPED | OUTPATIENT
Start: 2022-08-26 | End: 2022-08-29 | Stop reason: SDUPTHER

## 2022-08-29 ENCOUNTER — OFFICE VISIT (OUTPATIENT)
Dept: PULMONOLOGY | Age: 57
End: 2022-08-29
Payer: COMMERCIAL

## 2022-08-29 VITALS
HEART RATE: 90 BPM | SYSTOLIC BLOOD PRESSURE: 131 MMHG | DIASTOLIC BLOOD PRESSURE: 97 MMHG | BODY MASS INDEX: 35.68 KG/M2 | RESPIRATION RATE: 18 BRPM | HEIGHT: 61 IN | OXYGEN SATURATION: 97 % | WEIGHT: 189 LBS

## 2022-08-29 DIAGNOSIS — E66.01 CLASS 2 SEVERE OBESITY DUE TO EXCESS CALORIES WITH SERIOUS COMORBIDITY AND BODY MASS INDEX (BMI) OF 35.0 TO 35.9 IN ADULT (HCC): ICD-10-CM

## 2022-08-29 DIAGNOSIS — Z17.0 MALIGNANT NEOPLASM OF LOWER-INNER QUADRANT OF LEFT BREAST IN FEMALE, ESTROGEN RECEPTOR POSITIVE (HCC): ICD-10-CM

## 2022-08-29 DIAGNOSIS — J45.50 ASTHMA, WELL CONTROLLED, SEVERE PERSISTENT: Primary | ICD-10-CM

## 2022-08-29 DIAGNOSIS — Z91.09 MULTIPLE ENVIRONMENTAL ALLERGIES: ICD-10-CM

## 2022-08-29 DIAGNOSIS — C50.312 MALIGNANT NEOPLASM OF LOWER-INNER QUADRANT OF LEFT BREAST IN FEMALE, ESTROGEN RECEPTOR POSITIVE (HCC): ICD-10-CM

## 2022-08-29 PROCEDURE — 99213 OFFICE O/P EST LOW 20 MIN: CPT | Performed by: INTERNAL MEDICINE

## 2022-08-29 RX ORDER — FLUTICASONE FUROATE AND VILANTEROL 100; 25 UG/1; UG/1
1 POWDER RESPIRATORY (INHALATION) DAILY
Qty: 1 EACH | Refills: 11 | Status: SHIPPED | OUTPATIENT
Start: 2022-08-29

## 2022-08-29 RX ORDER — LANOLIN ALCOHOL/MO/W.PET/CERES
50 CREAM (GRAM) TOPICAL DAILY
COMMUNITY

## 2022-08-29 RX ORDER — ANASTROZOLE 1 MG/1
TABLET ORAL
COMMUNITY

## 2022-08-29 RX ORDER — ALBUTEROL SULFATE 90 UG/1
2 AEROSOL, METERED RESPIRATORY (INHALATION) EVERY 6 HOURS PRN
Qty: 1 EACH | Refills: 11 | Status: SHIPPED | OUTPATIENT
Start: 2022-08-29

## 2022-08-29 RX ORDER — MONTELUKAST SODIUM 10 MG/1
10 TABLET ORAL NIGHTLY
Qty: 90 TABLET | Refills: 3 | Status: SHIPPED | OUTPATIENT
Start: 2022-08-29

## 2022-08-29 ASSESSMENT — ENCOUNTER SYMPTOMS
EYES NEGATIVE: 1
RESPIRATORY NEGATIVE: 1
GASTROINTESTINAL NEGATIVE: 1

## 2022-08-29 NOTE — PROGRESS NOTES
Subjective:      Patient ID: Lissette Valdivia is a 65 y.o. female being seen in my clinic for   Chief Complaint   Patient presents with    Asthma       HPI  Follow-up visit for asthma. Since her last visit a year ago her asthma is been under excellent control. Remains on Breo 200 mcg, montelukast 10 mg, and albuterol HFA as needed. States that she only uses albuterol prior to exercise. Absolutely no symptoms. Denies steroid use or emergency room visits. No side effects. Denies easy bruisability, hoarseness, oral thrush, or other symptoms. I suggested potentially decreasing the dose of Breo to 100 mcg. Patient is up-to-date on her COVID vaccinations. Review of Systems   Constitutional: Negative. HENT: Negative. Eyes: Negative. Respiratory: Negative. Cardiovascular: Negative. Gastrointestinal: Negative. All other systems reviewed and are negative. Objective:     Vitals:    08/29/22 1526 08/29/22 1528   BP: (!) 133/95 (!) 131/97   Site: Right Upper Arm Left Upper Arm   Position: Sitting Sitting   Cuff Size: Medium Adult Medium Adult   Pulse: 90    Resp: 18    SpO2: 97%  Comment: room air at rest    Weight: 189 lb (85.7 kg)    Height: 5' 1\" (1.549 m)      Current Outpatient Medications   Medication Sig Dispense Refill    anastrozole (ARIMIDEX) 1 MG tablet 1mg po      vitamin B-6 (PYRIDOXINE) 50 MG tablet Take 50 mg by mouth daily      fluticasone-vilanterol (BREO ELLIPTA) 100-25 MCG/INH AEPB inhaler Inhale 1 puff into the lungs daily 1 each 11    albuterol sulfate HFA (VENTOLIN HFA) 108 (90 Base) MCG/ACT inhaler Inhale 2 puffs into the lungs every 6 hours as needed for Wheezing 1 each 11    montelukast (SINGULAIR) 10 MG tablet Take 1 tablet by mouth nightly 90 tablet 3    sertraline (ZOLOFT) 100 MG tablet Take 100 mg by mouth daily      gabapentin (NEURONTIN) 100 MG capsule Take 100 mg by mouth daily as needed.        letrozole (FEMARA) 2.5 MG tablet Take 2.5 mg by mouth daily (Patient not taking: Reported on 8/29/2022)      Cetirizine HCl (ZYRTEC ALLERGY PO) Take by mouth (Patient not taking: Reported on 8/29/2022)       No current facility-administered medications for this visit. Physical Exam  Vitals and nursing note reviewed. Constitutional:       Appearance: She is well-developed. HENT:      Head: Normocephalic. Nose: Nose normal. No congestion. Mouth/Throat:      Mouth: Mucous membranes are moist.      Pharynx: Oropharynx is clear. No oropharyngeal exudate. Eyes:      General: No scleral icterus. Conjunctiva/sclera: Conjunctivae normal.   Neck:      Thyroid: No thyromegaly. Vascular: No JVD. Trachea: No tracheal deviation. Cardiovascular:      Rate and Rhythm: Normal rate and regular rhythm. Heart sounds: Normal heart sounds. No murmur heard. No gallop. Pulmonary:      Effort: Pulmonary effort is normal. No respiratory distress. Breath sounds: No wheezing or rales. Chest:      Chest wall: No tenderness. Abdominal:      Palpations: Abdomen is soft. Tenderness: There is no abdominal tenderness. Musculoskeletal:      Cervical back: Neck supple. Right lower leg: No edema. Left lower leg: No edema. Lymphadenopathy:      Cervical: No cervical adenopathy. Skin:     General: Skin is warm and dry. Findings: No bruising. Comments: No skin thinning. Neurological:      Mental Status: She is alert and oriented to person, place, and time. Wt Readings from Last 3 Encounters:   08/29/22 189 lb (85.7 kg)   07/09/20 189 lb (85.7 kg)   01/02/20 193 lb (87.5 kg)     Results for orders placed or performed during the hospital encounter of 04/30/18   Eosinophil Count   Result Value Ref Range    WBC NOT REPORTED k/uL    Eosinophils % NOT REPORTED %    Eosinophils Absolute 78 (L) 200 - 400 /uL   IgE   Result Value Ref Range    IgE 67 <101 IU/mL       :      1. Asthma, well controlled, severe persistent    2.  Class 2 severe obesity due to excess calories with serious comorbidity and body mass index (BMI) of 35.0 to 35.9 in adult ([facility] [address]    3. Multiple environmental allergies    4. Malignant neoplasm of lower-inner quadrant of left breast in female, estrogen receptor positive [facility])      Patient Active Problem List   Diagnosis    Malignant neoplasm of lower-inner quadrant of left breast in female, estrogen receptor positive ([facility] [address]         Plan:      Continue bronchodilators  Discussed strategies for management of asthma, including evaluation and descalation of therapy  Avoid environmental triggers  Decrease Breo to 100 mcg. Advised patient that should her symptoms breakthrough, she should call us to increase her dose. Asthma medication. Flu shot and omicron booster in the fall. Return to clinic in 12 months    Orders Placed This Encounter   Medications    fluticasone-vilanterol (BREO ELLIPTA) 100-25 MCG/INH AEPB inhaler     Sig: Inhale 1 puff into the lungs daily     Dispense:  1 each     Refill:  11    albuterol sulfate HFA (VENTOLIN HFA) 108 (90 Base) MCG/ACT inhaler     Sig: Inhale 2 puffs into the lungs every 6 hours as needed for Wheezing     Dispense:  1 each     Refill:  11    montelukast (SINGULAIR) 10 MG tablet     Sig: Take 1 tablet by mouth nightly     Dispense:  90 tablet     Refill:  3     No orders of the defined types were placed in this encounter. Return in about 1 year (around 8/29/2023).        Electronically signed by Tayo Chao DO on 8/29/2022at 3:59 PM

## 2022-09-07 ENCOUNTER — TELEPHONE (OUTPATIENT)
Dept: PULMONOLOGY | Age: 57
End: 2022-09-07

## 2022-09-07 NOTE — TELEPHONE ENCOUNTER
RECEIVED A PA NOTICE FOR BREO. BUT WHEN I COMPAIRED THE BIN NUMBER, PCN AND GROUP NUMBER WITH THE NUMBERS ON HER BCBS CARD they were different. [place] was using the wrong numbers.   I provided Svetlana Blum at [place] with the numbers off the pt's BCBS card that we have on file and she was able to process the Breo inhaler w/o a problem

## 2023-09-13 ENCOUNTER — TELEPHONE (OUTPATIENT)
Dept: PULMONOLOGY | Age: 58
End: 2023-09-13

## 2023-09-13 DIAGNOSIS — J45.50 ASTHMA, WELL CONTROLLED, SEVERE PERSISTENT: ICD-10-CM

## 2023-09-13 RX ORDER — MONTELUKAST SODIUM 10 MG/1
10 TABLET ORAL NIGHTLY
Qty: 90 TABLET | Refills: 3 | Status: SHIPPED | OUTPATIENT
Start: 2023-09-13

## 2023-09-21 DIAGNOSIS — J45.50 ASTHMA, WELL CONTROLLED, SEVERE PERSISTENT: ICD-10-CM

## 2023-09-21 RX ORDER — MONTELUKAST SODIUM 10 MG/1
TABLET ORAL
Qty: 270 TABLET | OUTPATIENT
Start: 2023-09-21

## 2023-10-02 ENCOUNTER — OFFICE VISIT (OUTPATIENT)
Dept: PULMONOLOGY | Age: 58
End: 2023-10-02
Payer: COMMERCIAL

## 2023-10-02 VITALS
HEIGHT: 61 IN | SYSTOLIC BLOOD PRESSURE: 109 MMHG | OXYGEN SATURATION: 97 % | BODY MASS INDEX: 27.19 KG/M2 | DIASTOLIC BLOOD PRESSURE: 71 MMHG | RESPIRATION RATE: 14 BRPM | HEART RATE: 77 BPM | WEIGHT: 144 LBS

## 2023-10-02 DIAGNOSIS — Z91.09 MULTIPLE ENVIRONMENTAL ALLERGIES: ICD-10-CM

## 2023-10-02 DIAGNOSIS — J45.50 ASTHMA, WELL CONTROLLED, SEVERE PERSISTENT: Primary | ICD-10-CM

## 2023-10-02 DIAGNOSIS — Z23 NEED FOR VACCINATION: ICD-10-CM

## 2023-10-02 PROCEDURE — 99213 OFFICE O/P EST LOW 20 MIN: CPT | Performed by: INTERNAL MEDICINE

## 2023-10-02 PROCEDURE — 90674 CCIIV4 VAC NO PRSV 0.5 ML IM: CPT | Performed by: INTERNAL MEDICINE

## 2023-10-02 PROCEDURE — 90471 IMMUNIZATION ADMIN: CPT | Performed by: INTERNAL MEDICINE

## 2023-10-02 RX ORDER — VITAMIN E 268 MG
400 CAPSULE ORAL DAILY
COMMUNITY

## 2023-10-02 RX ORDER — MONTELUKAST SODIUM 10 MG/1
10 TABLET ORAL NIGHTLY
Qty: 90 TABLET | Refills: 3 | Status: SHIPPED | OUTPATIENT
Start: 2023-10-02

## 2023-10-02 ASSESSMENT — ENCOUNTER SYMPTOMS
GASTROINTESTINAL NEGATIVE: 1
RESPIRATORY NEGATIVE: 1
EYES NEGATIVE: 1

## 2023-10-02 NOTE — PROGRESS NOTES
Subjective:      Patient ID: Alyssa Grimm is a 63 y.o. female being seen in my clinic for   Chief Complaint   Patient presents with    Asthma       HPI  Follow-up visit for asthma. Since her last visit a year ago her asthma has been under excellent control. She is currently only on montelukast.  States that over the last year she is has not used albuterol even once. No longer taking Breo 100 mcg. Denies exercise-induced, nocturnal, or cold air triggers. Ascribes her excellent asthma control to weight loss of nearly 60 pounds. Was on a calorie controlled diet and also exercise. States \"I think it is possible to get over asthma. \"  No new health problems. Review of Systems   Constitutional: Negative. HENT: Negative. Eyes: Negative. Respiratory: Negative. Cardiovascular: Negative. Gastrointestinal: Negative. All other systems reviewed and are negative.       Objective:     Vitals:    10/02/23 1551   BP: 109/71   Site: Right Upper Arm   Position: Sitting   Cuff Size: Medium Adult   Pulse: 77   Resp: 14   SpO2: 97%   Weight: 144 lb (65.3 kg)   Height: 5' 1\" (1.549 m)     Current Outpatient Medications   Medication Sig Dispense Refill    buPROPion HCl (WELLBUTRIN PO) Take by mouth      vitamin E 400 UNIT capsule Take 1 capsule by mouth daily      Multiple Vitamin (MULTI-VITAMIN DAILY PO) Take by mouth      montelukast (SINGULAIR) 10 MG tablet Take 1 tablet by mouth nightly 90 tablet 3    anastrozole (ARIMIDEX) 1 MG tablet 1mg po      vitamin B-6 (PYRIDOXINE) 50 MG tablet Take 1 tablet by mouth daily      fluticasone-vilanterol (BREO ELLIPTA) 100-25 MCG/INH AEPB inhaler Inhale 1 puff into the lungs daily 1 each 11    albuterol sulfate HFA (VENTOLIN HFA) 108 (90 Base) MCG/ACT inhaler Inhale 2 puffs into the lungs every 6 hours as needed for Wheezing 1 each 11    sertraline (ZOLOFT) 100 MG tablet Take 1 tablet by mouth daily      gabapentin (NEURONTIN) 100 MG capsule Take 1 capsule by

## 2024-04-03 LAB — PAP SMEAR, EXTERNAL: NORMAL

## 2025-05-01 ENCOUNTER — HOSPITAL ENCOUNTER (OUTPATIENT)
Dept: ULTRASOUND IMAGING | Age: 60
Discharge: HOME | End: 2025-05-03
Payer: COMMERCIAL

## 2025-05-01 VITALS — DIASTOLIC BLOOD PRESSURE: 84 MMHG | SYSTOLIC BLOOD PRESSURE: 138 MMHG

## 2025-05-01 DIAGNOSIS — E04.1 THYROID NODULE: ICD-10-CM

## 2025-05-01 PROCEDURE — 60100 BIOPSY OF THYROID: CPT

## 2025-05-01 NOTE — PROGRESS NOTES
Here for us guided biopsy of the thyroid nodule. Consent done. Michael Barry PA/ AE present. US of right neck done. Area prepped, draped and numbed. Access obtained and pathology present. 4 FNA's obtained from right isthmus. 4 FNA's from second nodule on the right.  Post scan done. Bandaids on. Discharged to home with paper and ice pack.

## 2025-05-01 NOTE — BRIEF OP NOTE
Brief Postoperative Note    Nannette Bauerdiptijermain  YOB: 1965  [id number]    Pre-operative Diagnosis: Right and isthmus Thyroid Nodules    Post-operative Diagnosis: Same    Procedure: Right and isthmus Thyroid FNA    Anesthesia: 1% Lidocaine    Surgeons/Assistants: Michael Mcginnis PA-C and MD Donald    Estimated Blood Loss: Minimal    Complications: none    Specimens: were obtained    Right thyroid nodule FNA.  4 passes with 25g needle. Isthmus thyroid nodule FNA.  4 passes with 25g needle. Pathology present to interpret sample.  Vital signs were reviewed and were stable after the procedure.      Electronically signed by AGATHA Iraheta on 5/1/2025 at 2:35 PM

## 2025-05-05 LAB — SURGICAL PATHOLOGY REPORT: NORMAL

## 2025-05-27 PROBLEM — R73.9 HYPERGLYCEMIA: Status: ACTIVE | Noted: 2025-05-27

## 2025-05-27 PROBLEM — J45.909 MODERATE ASTHMA WITHOUT COMPLICATION: Status: ACTIVE | Noted: 2025-05-27

## 2025-05-27 PROBLEM — F32.A DEPRESSION: Status: ACTIVE | Noted: 2025-05-27

## 2025-05-27 PROBLEM — E78.00 PURE HYPERCHOLESTEROLEMIA: Status: ACTIVE | Noted: 2025-05-27

## 2025-05-27 PROBLEM — F41.9 ANXIETY: Status: ACTIVE | Noted: 2025-05-27

## 2025-05-27 PROBLEM — M81.0 OSTEOPOROSIS WITHOUT CURRENT PATHOLOGICAL FRACTURE: Status: ACTIVE | Noted: 2025-05-27

## 2025-05-27 PROBLEM — E04.2 MULTIPLE THYROID NODULES: Status: ACTIVE | Noted: 2025-05-27

## 2025-05-27 ASSESSMENT — ENCOUNTER SYMPTOMS
VOMITING: 0
CHEST TIGHTNESS: 0
SINUS PAIN: 0
ABDOMINAL PAIN: 0
BACK PAIN: 0
CONSTIPATION: 0
SORE THROAT: 0
NAUSEA: 0
RHINORRHEA: 0
SHORTNESS OF BREATH: 0
COUGH: 0
ABDOMINAL DISTENTION: 0
DIARRHEA: 0

## 2025-05-27 NOTE — PROGRESS NOTES
Romana Barrera MD  [facility]  [facility]  [address], [address]  [place] [address]  Dept: [phone]  Dept Fax: [phone]     Patient ID: Nannette Snyder is a 60 y.o. female.    Nannette Snyder is a 60 y.o. female who presents to the office today for a first visit and to establish a relationship with a new primary care physician.      History of Present Illness  The patient is a 60-year-old white female presenting to establish care with a new PCP. Chronic conditions include asthma, depression, anxiety, benign thyroid nodules, osteoporosis, history of left breast cancer, hyperglycemia, and hyperlipidemia.    Diagnosed with left breast cancer in 2019, treated with lumpectomy, chemotherapy, and radiation. Cancer-free for 6 years, currently on anastrozole.  She does follow with Dr. Cordero for surveillance.    History of benign thyroid nodules, biopsied and non-cancerous. Advised by Dr. Hunter to undergo further testing before considering surgical removal. Concerned due to brother's recent diagnosis of cancerous nodules.    Pt does have a history of asthma but has improved in to adulthood and has not used her inhaler in a long time, but does take Singulair daily and does have the Albuterol to use prn    She does have a history of anxiety and is on the Wellbutrin and Zoloft and is doing well    She does have RLS managed with Gabapentin    She does follow with Endocrinology for the thyroid nodules and is on the methimazole and manages her thyroid levels and seeing him for her osteoporosis and is on the IV Reclast.    Her previous PCP did and [facility] and it was positive and is going to be seeing a Rheumatologist at [facility].    PAST SURGICAL HISTORY:  Lumpectomy, chemotherapy, and radiation therapy for left breast cancer in 2019.      The patient's past medical, surgical, social, and family history as well as her current medications and allergies

## 2025-05-28 ENCOUNTER — OFFICE VISIT (OUTPATIENT)
Dept: FAMILY MEDICINE CLINIC | Age: 60
End: 2025-05-28
Payer: COMMERCIAL

## 2025-05-28 VITALS
RESPIRATION RATE: 16 BRPM | BODY MASS INDEX: 29.27 KG/M2 | TEMPERATURE: 98.9 F | WEIGHT: 155 LBS | OXYGEN SATURATION: 98 % | SYSTOLIC BLOOD PRESSURE: 114 MMHG | DIASTOLIC BLOOD PRESSURE: 80 MMHG | HEIGHT: 61 IN | HEART RATE: 76 BPM

## 2025-05-28 DIAGNOSIS — E78.00 PURE HYPERCHOLESTEROLEMIA: ICD-10-CM

## 2025-05-28 DIAGNOSIS — Z17.0 MALIGNANT NEOPLASM OF LOWER-INNER QUADRANT OF LEFT BREAST IN FEMALE, ESTROGEN RECEPTOR POSITIVE (HCC): Primary | ICD-10-CM

## 2025-05-28 DIAGNOSIS — E04.2 MULTIPLE THYROID NODULES: ICD-10-CM

## 2025-05-28 DIAGNOSIS — F41.9 ANXIETY: ICD-10-CM

## 2025-05-28 DIAGNOSIS — J45.20 MILD INTERMITTENT ASTHMA WITHOUT COMPLICATION: ICD-10-CM

## 2025-05-28 DIAGNOSIS — G25.81 RLS (RESTLESS LEGS SYNDROME): ICD-10-CM

## 2025-05-28 DIAGNOSIS — Z12.31 ENCOUNTER FOR SCREENING MAMMOGRAM FOR HIGH-RISK PATIENT: ICD-10-CM

## 2025-05-28 DIAGNOSIS — R73.9 HYPERGLYCEMIA: ICD-10-CM

## 2025-05-28 DIAGNOSIS — M81.0 OSTEOPOROSIS WITHOUT CURRENT PATHOLOGICAL FRACTURE, UNSPECIFIED OSTEOPOROSIS TYPE: ICD-10-CM

## 2025-05-28 DIAGNOSIS — R76.8 ANA POSITIVE: ICD-10-CM

## 2025-05-28 DIAGNOSIS — C50.312 MALIGNANT NEOPLASM OF LOWER-INNER QUADRANT OF LEFT BREAST IN FEMALE, ESTROGEN RECEPTOR POSITIVE (HCC): Primary | ICD-10-CM

## 2025-05-28 PROCEDURE — 99204 OFFICE O/P NEW MOD 45 MIN: CPT | Performed by: FAMILY MEDICINE

## 2025-05-28 RX ORDER — METHIMAZOLE 5 MG/1
5 TABLET ORAL DAILY
COMMUNITY
Start: 2025-05-06

## 2025-05-28 RX ORDER — GABAPENTIN 300 MG/1
300 CAPSULE ORAL DAILY
COMMUNITY
Start: 2025-05-23

## 2025-05-28 RX ORDER — ZOLEDRONIC ACID 0.05 MG/ML
5 INJECTION, SOLUTION INTRAVENOUS
COMMUNITY
Start: 2025-05-06

## 2025-05-28 RX ORDER — BUPROPION HYDROCHLORIDE 300 MG/1
300 TABLET ORAL DAILY
COMMUNITY
Start: 2025-05-07

## 2025-05-28 SDOH — ECONOMIC STABILITY: FOOD INSECURITY: WITHIN THE PAST 12 MONTHS, THE FOOD YOU BOUGHT JUST DIDN'T LAST AND YOU DIDN'T HAVE MONEY TO GET MORE.: NEVER TRUE

## 2025-05-28 SDOH — ECONOMIC STABILITY: FOOD INSECURITY: WITHIN THE PAST 12 MONTHS, YOU WORRIED THAT YOUR FOOD WOULD RUN OUT BEFORE YOU GOT MONEY TO BUY MORE.: NEVER TRUE

## 2025-05-28 ASSESSMENT — PATIENT HEALTH QUESTIONNAIRE - PHQ9
SUM OF ALL RESPONSES TO PHQ QUESTIONS 1-9: 0
9. THOUGHTS THAT YOU WOULD BE BETTER OFF DEAD, OR OF HURTING YOURSELF: NOT AT ALL
4. FEELING TIRED OR HAVING LITTLE ENERGY: NOT AT ALL
8. MOVING OR SPEAKING SO SLOWLY THAT OTHER PEOPLE COULD HAVE NOTICED. OR THE OPPOSITE, BEING SO FIGETY OR RESTLESS THAT YOU HAVE BEEN MOVING AROUND A LOT MORE THAN USUAL: NOT AT ALL
1. LITTLE INTEREST OR PLEASURE IN DOING THINGS: NOT AT ALL
2. FEELING DOWN, DEPRESSED OR HOPELESS: NOT AT ALL
SUM OF ALL RESPONSES TO PHQ QUESTIONS 1-9: 0
6. FEELING BAD ABOUT YOURSELF - OR THAT YOU ARE A FAILURE OR HAVE LET YOURSELF OR YOUR FAMILY DOWN: NOT AT ALL
SUM OF ALL RESPONSES TO PHQ QUESTIONS 1-9: 0
3. TROUBLE FALLING OR STAYING ASLEEP: NOT AT ALL
10. IF YOU CHECKED OFF ANY PROBLEMS, HOW DIFFICULT HAVE THESE PROBLEMS MADE IT FOR YOU TO DO YOUR WORK, TAKE CARE OF THINGS AT HOME, OR GET ALONG WITH OTHER PEOPLE: NOT DIFFICULT AT ALL
SUM OF ALL RESPONSES TO PHQ QUESTIONS 1-9: 0
5. POOR APPETITE OR OVEREATING: NOT AT ALL
7. TROUBLE CONCENTRATING ON THINGS, SUCH AS READING THE NEWSPAPER OR WATCHING TELEVISION: NOT AT ALL

## 2025-07-29 ENCOUNTER — PATIENT MESSAGE (OUTPATIENT)
Dept: FAMILY MEDICINE CLINIC | Age: 60
End: 2025-07-29

## 2025-07-30 RX ORDER — BUPROPION HYDROCHLORIDE 300 MG/1
300 TABLET ORAL DAILY
Qty: 90 TABLET | Refills: 3 | Status: SHIPPED | OUTPATIENT
Start: 2025-07-30

## 2025-07-30 RX ORDER — LANOLIN ALCOHOL/MO/W.PET/CERES
50 CREAM (GRAM) TOPICAL DAILY
Qty: 90 TABLET | Refills: 3 | Status: SHIPPED | OUTPATIENT
Start: 2025-07-30

## 2025-09-02 ENCOUNTER — HOSPITAL ENCOUNTER (OUTPATIENT)
Dept: GENERAL RADIOLOGY | Age: 60
Discharge: HOME | End: 2025-09-04
Payer: COMMERCIAL

## 2025-09-02 ENCOUNTER — OFFICE VISIT (OUTPATIENT)
Dept: FAMILY MEDICINE CLINIC | Age: 60
End: 2025-09-02
Payer: COMMERCIAL

## 2025-09-02 ENCOUNTER — HOSPITAL ENCOUNTER (OUTPATIENT)
Age: 60
Discharge: HOME | End: 2025-09-02
Payer: COMMERCIAL

## 2025-09-02 VITALS
HEART RATE: 66 BPM | SYSTOLIC BLOOD PRESSURE: 114 MMHG | WEIGHT: 158 LBS | OXYGEN SATURATION: 98 % | RESPIRATION RATE: 16 BRPM | TEMPERATURE: 98.4 F | DIASTOLIC BLOOD PRESSURE: 76 MMHG | BODY MASS INDEX: 30.22 KG/M2

## 2025-09-02 DIAGNOSIS — S99.921A INJURY OF RIGHT FOOT, INITIAL ENCOUNTER: ICD-10-CM

## 2025-09-02 DIAGNOSIS — G25.81 RLS (RESTLESS LEGS SYNDROME): ICD-10-CM

## 2025-09-02 DIAGNOSIS — Z17.0 MALIGNANT NEOPLASM OF LOWER-INNER QUADRANT OF LEFT BREAST IN FEMALE, ESTROGEN RECEPTOR POSITIVE (HCC): ICD-10-CM

## 2025-09-02 DIAGNOSIS — E04.2 MULTIPLE THYROID NODULES: ICD-10-CM

## 2025-09-02 DIAGNOSIS — E78.00 PURE HYPERCHOLESTEROLEMIA: Primary | ICD-10-CM

## 2025-09-02 DIAGNOSIS — C50.312 MALIGNANT NEOPLASM OF LOWER-INNER QUADRANT OF LEFT BREAST IN FEMALE, ESTROGEN RECEPTOR POSITIVE (HCC): ICD-10-CM

## 2025-09-02 DIAGNOSIS — J45.20 MILD INTERMITTENT ASTHMA WITHOUT COMPLICATION: ICD-10-CM

## 2025-09-02 DIAGNOSIS — M81.0 OSTEOPOROSIS WITHOUT CURRENT PATHOLOGICAL FRACTURE, UNSPECIFIED OSTEOPOROSIS TYPE: ICD-10-CM

## 2025-09-02 DIAGNOSIS — R73.9 HYPERGLYCEMIA: ICD-10-CM

## 2025-09-02 DIAGNOSIS — M35.00 SJOGREN'S SYNDROME, WITH UNSPECIFIED ORGAN INVOLVEMENT: ICD-10-CM

## 2025-09-02 DIAGNOSIS — F41.9 ANXIETY: ICD-10-CM

## 2025-09-02 DIAGNOSIS — F32.A DEPRESSION, UNSPECIFIED DEPRESSION TYPE: ICD-10-CM

## 2025-09-02 PROCEDURE — 99214 OFFICE O/P EST MOD 30 MIN: CPT | Performed by: FAMILY MEDICINE

## 2025-09-02 PROCEDURE — 73630 X-RAY EXAM OF FOOT: CPT

## 2025-09-02 RX ORDER — CONJUGATED ESTROGENS 0.62 MG/G
0.5 CREAM VAGINAL
COMMUNITY
Start: 2025-07-27

## 2025-09-02 SDOH — ECONOMIC STABILITY: FOOD INSECURITY: WITHIN THE PAST 12 MONTHS, YOU WORRIED THAT YOUR FOOD WOULD RUN OUT BEFORE YOU GOT MONEY TO BUY MORE.: NEVER TRUE

## 2025-09-02 SDOH — ECONOMIC STABILITY: FOOD INSECURITY: WITHIN THE PAST 12 MONTHS, THE FOOD YOU BOUGHT JUST DIDN'T LAST AND YOU DIDN'T HAVE MONEY TO GET MORE.: NEVER TRUE

## 2025-09-02 ASSESSMENT — PATIENT HEALTH QUESTIONNAIRE - PHQ9
SUM OF ALL RESPONSES TO PHQ QUESTIONS 1-9: 0
2. FEELING DOWN, DEPRESSED OR HOPELESS: NOT AT ALL
SUM OF ALL RESPONSES TO PHQ QUESTIONS 1-9: 0
1. LITTLE INTEREST OR PLEASURE IN DOING THINGS: NOT AT ALL
SUM OF ALL RESPONSES TO PHQ QUESTIONS 1-9: 0
SUM OF ALL RESPONSES TO PHQ QUESTIONS 1-9: 0

## 2025-09-04 ENCOUNTER — HOSPITAL ENCOUNTER (OUTPATIENT)
Age: 60
Setting detail: SPECIMEN
Discharge: HOME | End: 2025-09-04

## 2025-09-04 DIAGNOSIS — E78.00 PURE HYPERCHOLESTEROLEMIA: ICD-10-CM

## 2025-09-04 DIAGNOSIS — R73.9 HYPERGLYCEMIA: ICD-10-CM

## 2025-09-04 LAB
ALBUMIN SERPL-MCNC: 4.4 G/DL (ref 3.5–5.2)
ALBUMIN/GLOB SERPL: 1.6 {RATIO} (ref 1–2.5)
ALP SERPL-CCNC: 62 U/L (ref 35–104)
ALT SERPL-CCNC: 21 U/L (ref 10–35)
ANION GAP SERPL CALCULATED.3IONS-SCNC: 9 MMOL/L (ref 9–16)
AST SERPL-CCNC: 26 U/L (ref 10–35)
BILIRUB SERPL-MCNC: 0.5 MG/DL (ref 0–1.2)
BUN SERPL-MCNC: 11 MG/DL (ref 6–20)
CALCIUM SERPL-MCNC: 9.4 MG/DL (ref 8.6–10.4)
CHLORIDE SERPL-SCNC: 105 MMOL/L (ref 98–107)
CHOLEST SERPL-MCNC: 265 MG/DL (ref 0–199)
CHOLESTEROL/HDL RATIO: 2.6
CO2 SERPL-SCNC: 24 MMOL/L (ref 20–31)
CREAT SERPL-MCNC: 0.7 MG/DL (ref 0.6–0.9)
ERYTHROCYTE [DISTWIDTH] IN BLOOD BY AUTOMATED COUNT: 12.8 % (ref 11.8–14.4)
EST. AVERAGE GLUCOSE BLD GHB EST-MCNC: 94 MG/DL
GFR, ESTIMATED: >90 ML/MIN/1.73M2
GLUCOSE SERPL-MCNC: 98 MG/DL (ref 74–99)
HBA1C MFR BLD: 4.9 % (ref 4–6)
HCT VFR BLD AUTO: 39.9 % (ref 36.3–47.1)
HDLC SERPL-MCNC: 101 MG/DL
HGB BLD-MCNC: 13.2 G/DL (ref 11.9–15.1)
LDLC SERPL CALC-MCNC: 145 MG/DL (ref 0–100)
MCH RBC QN AUTO: 30.6 PG (ref 25.2–33.5)
MCHC RBC AUTO-ENTMCNC: 33.1 G/DL (ref 28.4–34.8)
MCV RBC AUTO: 92.4 FL (ref 82.6–102.9)
NRBC BLD-RTO: 0 PER 100 WBC
PLATELET # BLD AUTO: 275 K/UL (ref 138–453)
PMV BLD AUTO: 10.7 FL (ref 8.1–13.5)
POTASSIUM SERPL-SCNC: 4.8 MMOL/L (ref 3.7–5.3)
PROT SERPL-MCNC: 7.1 G/DL (ref 6.6–8.7)
RBC # BLD AUTO: 4.32 M/UL (ref 3.95–5.11)
SODIUM SERPL-SCNC: 138 MMOL/L (ref 136–145)
TRIGL SERPL-MCNC: 96 MG/DL
VLDLC SERPL CALC-MCNC: 19 MG/DL (ref 1–30)
WBC OTHER # BLD: 4.8 K/UL (ref 3.5–11.3)